# Patient Record
Sex: FEMALE | Race: BLACK OR AFRICAN AMERICAN | NOT HISPANIC OR LATINO | Employment: FULL TIME | URBAN - METROPOLITAN AREA
[De-identification: names, ages, dates, MRNs, and addresses within clinical notes are randomized per-mention and may not be internally consistent; named-entity substitution may affect disease eponyms.]

---

## 2017-07-21 ENCOUNTER — CONVERSION ENCOUNTER (OUTPATIENT)
Dept: MAMMOGRAPHY | Facility: HOSPITAL | Age: 52
End: 2017-07-21

## 2019-01-02 ENCOUNTER — HOSPITAL ENCOUNTER (OUTPATIENT)
Dept: URGENT CARE | Facility: CLINIC | Age: 54
Discharge: HOME | End: 2019-01-02
Attending: NURSE PRACTITIONER

## 2019-01-07 ENCOUNTER — CONVERSION ENCOUNTER (OUTPATIENT)
Dept: SURGERY | Facility: CLINIC | Age: 54
End: 2019-01-07

## 2019-01-07 ENCOUNTER — OFFICE VISIT CONVERTED (OUTPATIENT)
Dept: SURGERY | Facility: CLINIC | Age: 54
End: 2019-01-07
Attending: NURSE PRACTITIONER

## 2019-01-25 ENCOUNTER — HOSPITAL ENCOUNTER (OUTPATIENT)
Dept: MAMMOGRAPHY | Facility: HOSPITAL | Age: 54
Discharge: HOME | End: 2019-01-25
Attending: OBSTETRICS & GYNECOLOGY

## 2019-03-18 ENCOUNTER — HOSPITAL ENCOUNTER (OUTPATIENT)
Dept: GASTROENTEROLOGY | Facility: HOSPITAL | Age: 54
Setting detail: HOSPITAL OUTPATIENT SURGERY
Discharge: HOME | End: 2019-03-18
Attending: SURGERY

## 2019-03-18 LAB — HCG UR QL: NEGATIVE

## 2020-03-07 ENCOUNTER — HOSPITAL ENCOUNTER (OUTPATIENT)
Dept: URGENT CARE | Facility: CLINIC | Age: 55
Discharge: HOME | End: 2020-03-07

## 2020-05-01 ENCOUNTER — HOSPITAL ENCOUNTER (OUTPATIENT)
Dept: URGENT CARE | Facility: CLINIC | Age: 55
Discharge: HOME | End: 2020-05-01
Attending: FAMILY MEDICINE

## 2020-12-08 ENCOUNTER — HOSPITAL ENCOUNTER (OUTPATIENT)
Dept: URGENT CARE | Facility: CLINIC | Age: 55
Discharge: HOME | End: 2020-12-08
Attending: NURSE PRACTITIONER

## 2021-02-12 ENCOUNTER — HOSPITAL ENCOUNTER (OUTPATIENT)
Dept: GENERAL RADIOLOGY | Facility: HOSPITAL | Age: 56
Discharge: HOME | End: 2021-02-12
Attending: OBSTETRICS & GYNECOLOGY

## 2021-04-27 ENCOUNTER — HOSPITAL ENCOUNTER (OUTPATIENT)
Dept: URGENT CARE | Facility: CLINIC | Age: 56
Discharge: HOME | End: 2021-04-27
Attending: EMERGENCY MEDICINE

## 2021-05-15 VITALS — HEIGHT: 69 IN | WEIGHT: 213.37 LBS | BODY MASS INDEX: 31.6 KG/M2 | RESPIRATION RATE: 16 BRPM

## 2021-09-28 ENCOUNTER — LAB (OUTPATIENT)
Dept: LAB | Facility: HOSPITAL | Age: 56
End: 2021-09-28

## 2021-09-28 ENCOUNTER — TRANSCRIBE ORDERS (OUTPATIENT)
Dept: LAB | Facility: HOSPITAL | Age: 56
End: 2021-09-28

## 2021-09-28 DIAGNOSIS — E11.9 DIABETES MELLITUS WITHOUT COMPLICATION (HCC): ICD-10-CM

## 2021-09-28 DIAGNOSIS — E55.9 VITAMIN D DEFICIENCY: ICD-10-CM

## 2021-09-28 DIAGNOSIS — R53.83 TIREDNESS: ICD-10-CM

## 2021-09-28 DIAGNOSIS — E06.5 HYPOTHYROIDISM DUE TO FIBROUS INVASIVE THYROIDITIS: ICD-10-CM

## 2021-09-28 DIAGNOSIS — I10 ESSENTIAL HYPERTENSION, MALIGNANT: ICD-10-CM

## 2021-09-28 DIAGNOSIS — E03.8 HYPOTHYROIDISM DUE TO FIBROUS INVASIVE THYROIDITIS: ICD-10-CM

## 2021-09-28 DIAGNOSIS — R53.83 TIREDNESS: Primary | ICD-10-CM

## 2021-09-28 DIAGNOSIS — E78.5 HYPERLIPIDEMIA, UNSPECIFIED HYPERLIPIDEMIA TYPE: ICD-10-CM

## 2021-09-28 LAB
25(OH)D3 SERPL-MCNC: 36.2 NG/ML (ref 30–100)
ALBUMIN SERPL-MCNC: 4.9 G/DL (ref 3.5–5.2)
ALBUMIN/GLOB SERPL: 1.8 G/DL
ALP SERPL-CCNC: 77 U/L (ref 39–117)
ALT SERPL W P-5'-P-CCNC: 50 U/L (ref 1–33)
ANION GAP SERPL CALCULATED.3IONS-SCNC: 12.1 MMOL/L (ref 5–15)
AST SERPL-CCNC: 34 U/L (ref 1–32)
BASOPHILS # BLD AUTO: 0.06 10*3/MM3 (ref 0–0.2)
BASOPHILS NFR BLD AUTO: 0.9 % (ref 0–1.5)
BILIRUB SERPL-MCNC: 0.5 MG/DL (ref 0–1.2)
BUN SERPL-MCNC: 14 MG/DL (ref 6–20)
BUN/CREAT SERPL: 15.7 (ref 7–25)
CALCIUM SPEC-SCNC: 10.2 MG/DL (ref 8.6–10.5)
CHLORIDE SERPL-SCNC: 103 MMOL/L (ref 98–107)
CHOLEST SERPL-MCNC: 263 MG/DL (ref 0–200)
CO2 SERPL-SCNC: 25.9 MMOL/L (ref 22–29)
CREAT SERPL-MCNC: 0.89 MG/DL (ref 0.57–1)
DEPRECATED RDW RBC AUTO: 40.1 FL (ref 37–54)
EOSINOPHIL # BLD AUTO: 0.27 10*3/MM3 (ref 0–0.4)
EOSINOPHIL NFR BLD AUTO: 4 % (ref 0.3–6.2)
ERYTHROCYTE [DISTWIDTH] IN BLOOD BY AUTOMATED COUNT: 12.4 % (ref 12.3–15.4)
FOLATE SERPL-MCNC: 9.39 NG/ML (ref 4.78–24.2)
GFR SERPL CREATININE-BSD FRML MDRD: 80 ML/MIN/1.73
GLOBULIN UR ELPH-MCNC: 2.8 GM/DL
GLUCOSE SERPL-MCNC: 81 MG/DL (ref 65–99)
HBA1C MFR BLD: 5.81 % (ref 4.8–5.6)
HCT VFR BLD AUTO: 42.6 % (ref 34–46.6)
HDLC SERPL-MCNC: 81 MG/DL (ref 40–60)
HGB BLD-MCNC: 14.1 G/DL (ref 12–15.9)
IMM GRANULOCYTES # BLD AUTO: 0.01 10*3/MM3 (ref 0–0.05)
IMM GRANULOCYTES NFR BLD AUTO: 0.1 % (ref 0–0.5)
LDLC SERPL CALC-MCNC: 170 MG/DL (ref 0–100)
LDLC/HDLC SERPL: 2.06 {RATIO}
LYMPHOCYTES # BLD AUTO: 1.9 10*3/MM3 (ref 0.7–3.1)
LYMPHOCYTES NFR BLD AUTO: 28.2 % (ref 19.6–45.3)
MCH RBC QN AUTO: 29.4 PG (ref 26.6–33)
MCHC RBC AUTO-ENTMCNC: 33.1 G/DL (ref 31.5–35.7)
MCV RBC AUTO: 88.8 FL (ref 79–97)
MONOCYTES # BLD AUTO: 0.49 10*3/MM3 (ref 0.1–0.9)
MONOCYTES NFR BLD AUTO: 7.3 % (ref 5–12)
NEUTROPHILS NFR BLD AUTO: 4 10*3/MM3 (ref 1.7–7)
NEUTROPHILS NFR BLD AUTO: 59.5 % (ref 42.7–76)
NRBC BLD AUTO-RTO: 0 /100 WBC (ref 0–0.2)
PLATELET # BLD AUTO: 258 10*3/MM3 (ref 140–450)
PMV BLD AUTO: 10.4 FL (ref 6–12)
POTASSIUM SERPL-SCNC: 4 MMOL/L (ref 3.5–5.2)
PROT SERPL-MCNC: 7.7 G/DL (ref 6–8.5)
RBC # BLD AUTO: 4.8 10*6/MM3 (ref 3.77–5.28)
SODIUM SERPL-SCNC: 141 MMOL/L (ref 136–145)
T4 FREE SERPL-MCNC: 1.11 NG/DL (ref 0.93–1.7)
TRIGL SERPL-MCNC: 74 MG/DL (ref 0–150)
TSH SERPL DL<=0.05 MIU/L-ACNC: 1.01 UIU/ML (ref 0.27–4.2)
VIT B12 BLD-MCNC: 1037 PG/ML (ref 211–946)
VLDLC SERPL-MCNC: 12 MG/DL (ref 5–40)
WBC # BLD AUTO: 6.73 10*3/MM3 (ref 3.4–10.8)

## 2021-09-28 PROCEDURE — 36415 COLL VENOUS BLD VENIPUNCTURE: CPT

## 2021-09-28 PROCEDURE — 83036 HEMOGLOBIN GLYCOSYLATED A1C: CPT

## 2021-09-28 PROCEDURE — 85025 COMPLETE CBC W/AUTO DIFF WBC: CPT

## 2021-09-28 PROCEDURE — 84439 ASSAY OF FREE THYROXINE: CPT

## 2021-09-28 PROCEDURE — 82607 VITAMIN B-12: CPT

## 2021-09-28 PROCEDURE — 80061 LIPID PANEL: CPT

## 2021-09-28 PROCEDURE — 80053 COMPREHEN METABOLIC PANEL: CPT

## 2021-09-28 PROCEDURE — 82746 ASSAY OF FOLIC ACID SERUM: CPT

## 2021-09-28 PROCEDURE — 82306 VITAMIN D 25 HYDROXY: CPT

## 2021-09-28 PROCEDURE — 84443 ASSAY THYROID STIM HORMONE: CPT

## 2022-01-08 PROCEDURE — U0004 COV-19 TEST NON-CDC HGH THRU: HCPCS | Performed by: PHYSICIAN ASSISTANT

## 2022-01-10 ENCOUNTER — TELEPHONE (OUTPATIENT)
Dept: URGENT CARE | Facility: CLINIC | Age: 57
End: 2022-01-10

## 2022-01-10 DIAGNOSIS — U07.1 COVID-19: Primary | ICD-10-CM

## 2022-01-10 DIAGNOSIS — R05.9 COUGH: ICD-10-CM

## 2022-01-10 RX ORDER — DEXTROMETHORPHAN HYDROBROMIDE AND PROMETHAZINE HYDROCHLORIDE 15; 6.25 MG/5ML; MG/5ML
5 SYRUP ORAL 4 TIMES DAILY PRN
Qty: 118 ML | Refills: 0 | Status: SHIPPED | OUTPATIENT
Start: 2022-01-10 | End: 2022-01-15

## 2022-01-10 NOTE — TELEPHONE ENCOUNTER
Spoke with the patient via telephone and verified the patient's name, date of birth, street address.  Notify the patient that they are positive for COVID.  Discussed quarantine, symptomatic management, ER precautions with the patient.  Patient states that she was supposed to be prescribed a cough medicine.  States that she attempted to pick it up at the pharmacy however they did not have medications to be able to make the medication.  Patient requested to be sent to a different pharmacy.  All questions answered and patient verbalized understanding of information.

## 2022-03-28 ENCOUNTER — TRANSCRIBE ORDERS (OUTPATIENT)
Dept: ADMINISTRATIVE | Facility: HOSPITAL | Age: 57
End: 2022-03-28

## 2022-03-28 DIAGNOSIS — J45.40 ASTHMA, MODERATE PERSISTENT, POORLY-CONTROLLED: Primary | ICD-10-CM

## 2022-05-31 ENCOUNTER — HOSPITAL ENCOUNTER (OUTPATIENT)
Dept: RESPIRATORY THERAPY | Facility: HOSPITAL | Age: 57
End: 2022-05-31

## 2022-08-22 ENCOUNTER — OFFICE VISIT (OUTPATIENT)
Dept: OBSTETRICS AND GYNECOLOGY | Facility: CLINIC | Age: 57
End: 2022-08-22

## 2022-08-22 VITALS
WEIGHT: 225 LBS | SYSTOLIC BLOOD PRESSURE: 137 MMHG | BODY MASS INDEX: 33.23 KG/M2 | HEART RATE: 66 BPM | DIASTOLIC BLOOD PRESSURE: 77 MMHG

## 2022-08-22 DIAGNOSIS — Z01.419 WELL WOMAN EXAM: Primary | ICD-10-CM

## 2022-08-22 PROBLEM — R87.619 ABNORMAL PAP SMEAR OF CERVIX: Status: ACTIVE | Noted: 2022-08-22

## 2022-08-22 LAB
ALBUMIN SERPL-MCNC: 4.9 G/DL (ref 3.5–5.2)
ALBUMIN/GLOB SERPL: 1.8 G/DL
ALP SERPL-CCNC: 91 U/L (ref 39–117)
ALT SERPL W P-5'-P-CCNC: 23 U/L (ref 1–33)
ANION GAP SERPL CALCULATED.3IONS-SCNC: 11.6 MMOL/L (ref 5–15)
AST SERPL-CCNC: 26 U/L (ref 1–32)
BILIRUB SERPL-MCNC: 0.4 MG/DL (ref 0–1.2)
BUN SERPL-MCNC: 18 MG/DL (ref 6–20)
BUN/CREAT SERPL: 19.1 (ref 7–25)
CALCIUM SPEC-SCNC: 9.8 MG/DL (ref 8.6–10.5)
CHLORIDE SERPL-SCNC: 101 MMOL/L (ref 98–107)
CHOLEST SERPL-MCNC: 234 MG/DL (ref 0–200)
CO2 SERPL-SCNC: 28.4 MMOL/L (ref 22–29)
CREAT SERPL-MCNC: 0.94 MG/DL (ref 0.57–1)
DEPRECATED RDW RBC AUTO: 39.8 FL (ref 37–54)
EGFRCR SERPLBLD CKD-EPI 2021: 71.4 ML/MIN/1.73
ERYTHROCYTE [DISTWIDTH] IN BLOOD BY AUTOMATED COUNT: 12.8 % (ref 12.3–15.4)
GLOBULIN UR ELPH-MCNC: 2.8 GM/DL
GLUCOSE SERPL-MCNC: 83 MG/DL (ref 65–99)
HBA1C MFR BLD: 5.8 % (ref 4.8–5.6)
HCT VFR BLD AUTO: 42.4 % (ref 34–46.6)
HDLC SERPL-MCNC: 84 MG/DL (ref 40–60)
HGB BLD-MCNC: 14 G/DL (ref 12–15.9)
LDLC SERPL CALC-MCNC: 131 MG/DL (ref 0–100)
LDLC/HDLC SERPL: 1.53 {RATIO}
MCH RBC QN AUTO: 28.9 PG (ref 26.6–33)
MCHC RBC AUTO-ENTMCNC: 33 G/DL (ref 31.5–35.7)
MCV RBC AUTO: 87.4 FL (ref 79–97)
PLATELET # BLD AUTO: 244 10*3/MM3 (ref 140–450)
PMV BLD AUTO: 10.2 FL (ref 6–12)
POTASSIUM SERPL-SCNC: 4 MMOL/L (ref 3.5–5.2)
PROT SERPL-MCNC: 7.7 G/DL (ref 6–8.5)
RBC # BLD AUTO: 4.85 10*6/MM3 (ref 3.77–5.28)
SODIUM SERPL-SCNC: 141 MMOL/L (ref 136–145)
TRIGL SERPL-MCNC: 108 MG/DL (ref 0–150)
TSH SERPL DL<=0.05 MIU/L-ACNC: 1.74 UIU/ML (ref 0.27–4.2)
VLDLC SERPL-MCNC: 19 MG/DL (ref 5–40)
WBC NRBC COR # BLD: 7.35 10*3/MM3 (ref 3.4–10.8)

## 2022-08-22 PROCEDURE — G0123 SCREEN CERV/VAG THIN LAYER: HCPCS | Performed by: OBSTETRICS & GYNECOLOGY

## 2022-08-22 PROCEDURE — 80061 LIPID PANEL: CPT | Performed by: OBSTETRICS & GYNECOLOGY

## 2022-08-22 PROCEDURE — 80050 GENERAL HEALTH PANEL: CPT | Performed by: OBSTETRICS & GYNECOLOGY

## 2022-08-22 PROCEDURE — 83036 HEMOGLOBIN GLYCOSYLATED A1C: CPT | Performed by: OBSTETRICS & GYNECOLOGY

## 2022-08-22 PROCEDURE — 99396 PREV VISIT EST AGE 40-64: CPT | Performed by: OBSTETRICS & GYNECOLOGY

## 2022-08-22 PROCEDURE — 87624 HPV HI-RISK TYP POOLED RSLT: CPT | Performed by: OBSTETRICS & GYNECOLOGY

## 2022-08-22 RX ORDER — RIMEGEPANT SULFATE 75 MG/75MG
1 TABLET, ORALLY DISINTEGRATING ORAL EVERY OTHER DAY
COMMUNITY
Start: 2022-06-07

## 2022-08-22 RX ORDER — OMEPRAZOLE 20 MG/1
20 CAPSULE, DELAYED RELEASE ORAL DAILY
COMMUNITY
Start: 2022-06-22 | End: 2023-03-06 | Stop reason: DRUGHIGH

## 2022-08-22 RX ORDER — PREDNISONE 10 MG/1
1 TABLET ORAL DAILY
COMMUNITY
Start: 2022-05-24 | End: 2022-09-29

## 2022-08-22 NOTE — PROGRESS NOTES
Well Woman Visit    CC: Scheduled annual well gyn visit      [facility] intake in the past?: Yes    DATE PERFORMED:  Previously Qualified? NO Changes in Family Hx since? NO    HPI:   56 y.o.   Social History     Substance and Sexual Activity   Sexual Activity Not Currently   • Partners: Male   • Birth control/protection: Post-menopausal     No VB. OTC Estroven works well.     Pain:  None    PCP: needs PCP  History: PMHx, Meds, Allergies, PSHx, Social Hx, and POBHx all reviewed and updated.    Pt has no complaints today.    PHYSICAL EXAM:  /77   Pulse 66   Wt 102 kg (225 lb)   Breastfeeding No   BMI 33.23 kg/m²  Not found.  General- NAD, alert and oriented, appropriate  Psych- Normal mood, good memory  Neck- No masses, no thyroid enlargement  CV- Regular rhythm, no murnurs  Resp- CTA to bases, no wheezes  Abdomen- Soft, non distended, non tender, no masses    Breast left-  Bilaterally symmetrical, no masses, non tender, no nipple discharge  Breast right- Bilaterally symmetrical, no masses, non tender, no nipple discharge    External genitalia- Normal female, no lesions  Urethra/meatus- Normal, no masses, non tender  Bladder- Normal, no masses, non tender  Vagina- Normal, no atrophy, no lesions, no discharge.  Prolapse: No prolapse  Cvx- Normal, no lesions, no discharge, No cervical motion tenderness  Uterus- Normal size, shape & consistency.  Non tender, mobile, & no prolapse  Adnexa- No mass, non tender  Anus/Rectum/Perineum- Normal appearance, no mass, good sphincter tone, no hemorrhoids, no prolapse    Lymphatic- No palpable neck, axillary, or groin nodes  Ext- No edema, no cyanosis    Skin- No lesions, no rashes, no acanthosis nigricans      ASSESSMENT and PLAN:    Diagnoses and all orders for this visit:    1. Well woman exam (Primary)  -     IgP, Aptima HPV  -     Mammo Screening Digital Tomosynthesis Bilateral With CAD  -     Hemoglobin A1c  -     Lipid Panel  -     TSH  -     CBC (No Diff)  -      Comprehensive Metabolic Panel        Preventative:  • BREAST HEALTH- Monthly self breast exam importance and how to reviewed. MMG and/or MRI (prn) reviewed per society guidelines and her individual history. Screen: Updated today  • CERVICAL CANCER Screening- Reviewed current ASCCP guidelines for screening w and wo cotest HPV, age specific.  Screen: Updated today  • COLON CANCER Screening- Reviewed current medical society guidelines and options.  Screen:  Already up to date  • Importance of WEIGHT MANAGEMENT, nutrition, and exercise reviewed  • BONE HEALTH- Reviewed current medical society guidelines and options for testing, calcium and vit D intake.  Weight bearing exercise.  DEXA: Not medically needed  • VACCINATIONS Recommended: COVID and booster PRN, Flu annually, Tdap [id number], Zoster (49yo and older).  Importance discussed, risk being unvaccinated reviewed.  Questions answered  • Smoking status- NON SMOKER  • Follow up PCP/Specialist PMHx and Labs      She understands the importance of having any ordered tests to be performed in a timely fashion.  The risks of not performing them include, but are not limited to, advanced cancer stages, bone loss from osteoporosis and/or subsequent increase in morbidity and/or mortality.  She is encouraged to review her results online and/or contact or office if she has questions.     Follow Up:  Return in about 1 year (around 8/22/2023) for WWE.            Rin Wilks, DO  08/22/2022    [facility]  [address] [place] [address]  Dept: [phone]  Dept Fax: [phone]  Loc: [phone]  Loc Fax: [phone]

## 2022-08-26 LAB
CYTOLOGIST CVX/VAG CYTO: NORMAL
CYTOLOGY CVX/VAG DOC CYTO: NORMAL
CYTOLOGY CVX/VAG DOC THIN PREP: NORMAL
DX ICD CODE: NORMAL
HIV 1 & 2 AB SER-IMP: NORMAL
HPV I/H RISK 4 DNA CVX QL PROBE+SIG AMP: NEGATIVE
OTHER STN SPEC: NORMAL
STAT OF ADQ CVX/VAG CYTO-IMP: NORMAL

## 2022-09-03 ENCOUNTER — HOSPITAL ENCOUNTER (OUTPATIENT)
Dept: MAMMOGRAPHY | Facility: HOSPITAL | Age: 57
Discharge: HOME | End: 2022-09-03
Admitting: OBSTETRICS & GYNECOLOGY

## 2022-09-03 PROCEDURE — 77067 SCR MAMMO BI INCL CAD: CPT

## 2022-09-03 PROCEDURE — 77063 BREAST TOMOSYNTHESIS BI: CPT

## 2023-02-22 ENCOUNTER — OFFICE VISIT (OUTPATIENT)
Dept: FAMILY MEDICINE CLINIC | Facility: CLINIC | Age: 58
End: 2023-02-22
Payer: COMMERCIAL

## 2023-02-22 VITALS
SYSTOLIC BLOOD PRESSURE: 130 MMHG | TEMPERATURE: 97.8 F | OXYGEN SATURATION: 99 % | WEIGHT: 216 LBS | BODY MASS INDEX: 31.99 KG/M2 | HEIGHT: 69 IN | DIASTOLIC BLOOD PRESSURE: 74 MMHG

## 2023-02-22 DIAGNOSIS — R63.4 UNINTENTIONAL WEIGHT LOSS: ICD-10-CM

## 2023-02-22 DIAGNOSIS — R73.03 PREDIABETES: ICD-10-CM

## 2023-02-22 DIAGNOSIS — E78.2 MIXED HYPERLIPIDEMIA: ICD-10-CM

## 2023-02-22 DIAGNOSIS — Z76.89 ENCOUNTER TO ESTABLISH CARE: Primary | ICD-10-CM

## 2023-02-22 DIAGNOSIS — G43.909 MIGRAINE WITHOUT STATUS MIGRAINOSUS, NOT INTRACTABLE, UNSPECIFIED MIGRAINE TYPE: ICD-10-CM

## 2023-02-22 DIAGNOSIS — I10 PRIMARY HYPERTENSION: ICD-10-CM

## 2023-02-22 DIAGNOSIS — K21.9 GASTROESOPHAGEAL REFLUX DISEASE, UNSPECIFIED WHETHER ESOPHAGITIS PRESENT: ICD-10-CM

## 2023-02-22 DIAGNOSIS — J45.40 MODERATE PERSISTENT ASTHMA WITHOUT COMPLICATION: ICD-10-CM

## 2023-02-22 PROCEDURE — 99204 OFFICE O/P NEW MOD 45 MIN: CPT

## 2023-02-22 RX ORDER — LOSARTAN POTASSIUM AND HYDROCHLOROTHIAZIDE 12.5; 1 MG/1; MG/1
1 TABLET ORAL DAILY
Qty: 90 TABLET | Refills: 0 | Status: SHIPPED | OUTPATIENT
Start: 2023-02-22

## 2023-02-22 RX ORDER — EZETIMIBE/ATORVASTATIN CALCIUM 10 MG-10MG
TABLET ORAL NIGHTLY
COMMUNITY
End: 2023-02-22 | Stop reason: SDUPTHER

## 2023-02-22 RX ORDER — EZETIMIBE/ATORVASTATIN CALCIUM 10 MG-10MG
1 TABLET ORAL NIGHTLY
Qty: 90 TABLET | Refills: 0 | Status: SHIPPED | OUTPATIENT
Start: 2023-02-22 | End: 2023-02-28

## 2023-02-22 NOTE — ASSESSMENT & PLAN NOTE
Headaches are improving with treatment.  Continue current treatment regimen.  Advised to keep a headache diary.  Counseled regarding lifestyle modifications.

## 2023-02-22 NOTE — ASSESSMENT & PLAN NOTE
Lipid abnormalities are newly identified.  Nutritional counseling was provided. and Pharmacotherapy as ordered.  Lipids will be reassessed In 2 months.

## 2023-02-22 NOTE — ASSESSMENT & PLAN NOTE
Asthma is newly identified.  The patient is experiencing weekly daytime asthma symptoms. She is experiencing weekly nighttime asthma symptoms.  Discussed monitoring symptoms and use of quick-relief medications and contacting us early in the course of exacerbations.  Warning signs of respiratory distress were reviewed with the patient.

## 2023-02-22 NOTE — ASSESSMENT & PLAN NOTE
Hypertension is newly identified.  Continue current treatment regimen.  Dietary sodium restriction.  Continue current medications.  Blood pressure will be reassessed at the next regular appointment.

## 2023-02-22 NOTE — PROGRESS NOTES
Chief Complaint  Establish Care    Rishabh Donaldson presents to [facility]  History of Present Illness  Patient presents today to establish care.  She was previously seeing a PCP within this area who is no longer practicing. Patient was seen at urgent care facility the first week of February for URI and was treated for pneumonia. She was prescribed antibiotics, steroids and cough syrup.  Patient has been out of some of her medications since she has not seen a PCP in some time.  She did see her OB/GYN in August who did some routine lab work that showed elevated lipids and A1c.    Hypertension: Well managed on current medications of Hyzaar. Patient has been taking a daily magnesium as well because she was told that would help with her blood pressure.  Blood pressure is slightly elevated in the office today at 130/74.  Patient denies any associated symptoms such as current headache, chest pain, swelling to her extremities.    Hyperlipidemia: Currently taking Zetia-Atorvastatin daily.  Lipid panel was drawn by OB/GYN when she was seen in August with elevated total cholesterol at 234, elevated LDL of 131.    GERD/abdominal pain/weight loss/constipation: Patient had a work related injury a couple years ago and had a CT with contrast done that showed some abnormality with her liver. This imaging is not available and patient does not recall any further details. She describes her abdominal pain as an ache and intermittent cramping. She notices that the pain is worse after eating spicy and acidic foods. Bowel movements are irregular unless she takes the Linzess. She was previously prescribed this for IBS with predominant constipation. Her stool is sometimes dark in color, particularly when she is having abdominal pain. She last had a colonoscopy in March of 2019. She has never had an upper scope. She is scheduled to see gastro in March.     Chronic pain: She works at [employer] and  "is on her feet a lot. She has chronic low back pain, bilateral hip pain, bilateral foot pain due to flat feet. She sees pain management and podiatry for these issues.  She takes tramadol 50 mg twice daily and diclofenac 100 mg daily as needed.  She is try to decrease her use of diclofenac given her increase in abdominal pain but states that her pain is unmanageable without occasional use of diclofenac in addition to regular use of tramadol.    Migraines: Averages about 1 migraine headache weekly. She has been taking Nurtec every other day and is now using that as needed.     Asthma: Patient sees a pulmonologist in [place]. She is on multiple inhalers including Advair, Spiriva and Albuterol as needed.     Prediabetic: A1c previously 5.81 1-year ago and 5.80 6 months ago.  Patient has never been treated for diabetes in the past.      Objective   Vital Signs:  /74 (BP Location: Left arm, Patient Position: Sitting)   Temp 97.8 °F (36.6 °C) (Oral)   Ht 175.3 cm (69\")   Wt 98 kg (216 lb)   SpO2 99%   BMI 31.90 kg/m²   Estimated body mass index is 31.9 kg/m² as calculated from the following:    Height as of this encounter: 175.3 cm (69\").    Weight as of this encounter: 98 kg (216 lb).       BMI is >= 30 and <35. (Class 1 Obesity). The following options were offered after discussion;: exercise counseling/recommendations and nutrition counseling/recommendations      Physical Exam  Vitals reviewed.   Constitutional:       Appearance: Normal appearance. She is well-developed. She is obese.   HENT:      Head: Normocephalic and atraumatic.   Eyes:      Conjunctiva/sclera: Conjunctivae normal.      Pupils: Pupils are equal, round, and reactive to light.   Cardiovascular:      Rate and Rhythm: Normal rate and regular rhythm.      Heart sounds: No murmur heard.    No friction rub. No gallop.   Pulmonary:      Effort: Pulmonary effort is normal.      Breath sounds: Examination of the right-upper field reveals " wheezing. Examination of the left-upper field reveals wheezing. Wheezing present. No rhonchi.   Abdominal:      General: Bowel sounds are normal. There is no distension.      Palpations: Abdomen is soft.      Tenderness: There is no abdominal tenderness.   Skin:     General: Skin is warm and dry.   Neurological:      Mental Status: She is alert and oriented to person, place, and time.      Cranial Nerves: No cranial nerve deficit.   Psychiatric:         Mood and Affect: Mood and affect normal.         Behavior: Behavior normal.         Thought Content: Thought content normal.         Judgment: Judgment normal.        Result Review :                   Assessment and Plan   Diagnoses and all orders for this visit:    1. Encounter to establish care (Primary)    2. Primary hypertension  Assessment & Plan:  Hypertension is newly identified.  Continue current treatment regimen.  Dietary sodium restriction.  Continue current medications.  Blood pressure will be reassessed at the next regular appointment.    Orders:  -     losartan-hydrochlorothiazide (Hyzaar) 100-12.5 MG per tablet; Take 1 tablet by mouth Daily. Contact your primary care provider for any refill tomorrow.  Dispense: 90 tablet; Refill: 0  -     Hemoglobin A1c; Future  -     CBC w AUTO Differential; Future  -     Comprehensive metabolic panel; Future    3. Prediabetes  -     Hemoglobin A1c; Future  -     CBC w AUTO Differential; Future  -     Comprehensive metabolic panel; Future    4. Mixed hyperlipidemia  Assessment & Plan:  Lipid abnormalities are newly identified.  Nutritional counseling was provided. and Pharmacotherapy as ordered.  Lipids will be reassessed In 2 months.    Orders:  -     Lipid Panel; Future  -     Hemoglobin A1c; Future  -     CBC w AUTO Differential; Future  -     Comprehensive metabolic panel; Future    5. Gastroesophageal reflux disease, unspecified whether esophagitis present    6. Unintentional weight loss    7. Migraine without  status migrainosus, not intractable, unspecified migraine type  Assessment & Plan:  Headaches are improving with treatment.  Continue current treatment regimen.  Advised to keep a headache diary.  Counseled regarding lifestyle modifications.          8. Moderate persistent asthma without complication  Assessment & Plan:  Asthma is newly identified.  The patient is experiencing weekly daytime asthma symptoms. She is experiencing weekly nighttime asthma symptoms.  Discussed monitoring symptoms and use of quick-relief medications and contacting us early in the course of exacerbations.  Warning signs of respiratory distress were reviewed with the patient.           Other orders  -     Ezetimibe-Atorvastatin 10-10 MG tablet; Take 1 tablet by mouth Every Night.  Dispense: 90 tablet; Refill: 0  Patient has been out of her medications for several months now, stating that she has not taken her cholesterol medication for some time and is fearful that her lipids will be significantly elevated.  Labs were drawn by OB/GYN in August with only abnormalities being slightly elevated hemoglobin A1c and lipid panel.  Patient will be provided refills on her medications and will follow-up with me in 2 months.  She was advised to walk-in to have her labs drawn 1 week prior to follow-up appointment.    Patient was referred to gastroenterology by a previous provider that she saw and has an upcoming appointment next month to further evaluate her symptoms of GERD, abdominal pain, IBS with predominant constipation and unintentional weight loss.  She did have a colonoscopy done in 2019 which showed diverticulosis but no other abnormalities.  She denies any blood in her stool or significant changes in her bowel pattern although she does have constipation when she does not take the Linzess regularly.         Follow Up   Return in about 2 months (around 4/22/2023).  Patient was given instructions and counseling regarding her condition or for  health maintenance advice. Please see specific information pulled into the AVS if appropriate.

## 2023-02-27 ENCOUNTER — LAB (OUTPATIENT)
Dept: LAB | Facility: HOSPITAL | Age: 58
End: 2023-02-27
Payer: COMMERCIAL

## 2023-02-27 DIAGNOSIS — I10 PRIMARY HYPERTENSION: ICD-10-CM

## 2023-02-27 DIAGNOSIS — E78.2 MIXED HYPERLIPIDEMIA: ICD-10-CM

## 2023-02-27 DIAGNOSIS — R73.03 PREDIABETES: ICD-10-CM

## 2023-02-27 LAB
ALBUMIN SERPL-MCNC: 4.5 G/DL (ref 3.5–5.2)
ALBUMIN/GLOB SERPL: 1.6 G/DL
ALP SERPL-CCNC: 83 U/L (ref 39–117)
ALT SERPL W P-5'-P-CCNC: 19 U/L (ref 1–33)
ANION GAP SERPL CALCULATED.3IONS-SCNC: 7.4 MMOL/L (ref 5–15)
AST SERPL-CCNC: 22 U/L (ref 1–32)
BASOPHILS # BLD AUTO: 0.04 10*3/MM3 (ref 0–0.2)
BASOPHILS NFR BLD AUTO: 0.6 % (ref 0–1.5)
BILIRUB SERPL-MCNC: 0.6 MG/DL (ref 0–1.2)
BUN SERPL-MCNC: 10 MG/DL (ref 6–20)
BUN/CREAT SERPL: 11.6 (ref 7–25)
CALCIUM SPEC-SCNC: 10.2 MG/DL (ref 8.6–10.5)
CHLORIDE SERPL-SCNC: 102 MMOL/L (ref 98–107)
CHOLEST SERPL-MCNC: 255 MG/DL (ref 0–200)
CO2 SERPL-SCNC: 29.6 MMOL/L (ref 22–29)
CREAT SERPL-MCNC: 0.86 MG/DL (ref 0.57–1)
DEPRECATED RDW RBC AUTO: 40.9 FL (ref 37–54)
EGFRCR SERPLBLD CKD-EPI 2021: 78.9 ML/MIN/1.73
EOSINOPHIL # BLD AUTO: 0.21 10*3/MM3 (ref 0–0.4)
EOSINOPHIL NFR BLD AUTO: 3 % (ref 0.3–6.2)
ERYTHROCYTE [DISTWIDTH] IN BLOOD BY AUTOMATED COUNT: 12.4 % (ref 12.3–15.4)
GLOBULIN UR ELPH-MCNC: 2.8 GM/DL
GLUCOSE SERPL-MCNC: 88 MG/DL (ref 65–99)
HBA1C MFR BLD: 6 % (ref 4.8–5.6)
HCT VFR BLD AUTO: 41.2 % (ref 34–46.6)
HDLC SERPL-MCNC: 97 MG/DL (ref 40–60)
HGB BLD-MCNC: 14 G/DL (ref 12–15.9)
IMM GRANULOCYTES # BLD AUTO: 0.02 10*3/MM3 (ref 0–0.05)
IMM GRANULOCYTES NFR BLD AUTO: 0.3 % (ref 0–0.5)
LDLC SERPL CALC-MCNC: 145 MG/DL (ref 0–100)
LDLC/HDLC SERPL: 1.46 {RATIO}
LYMPHOCYTES # BLD AUTO: 1.63 10*3/MM3 (ref 0.7–3.1)
LYMPHOCYTES NFR BLD AUTO: 23.6 % (ref 19.6–45.3)
MCH RBC QN AUTO: 30.4 PG (ref 26.6–33)
MCHC RBC AUTO-ENTMCNC: 34 G/DL (ref 31.5–35.7)
MCV RBC AUTO: 89.4 FL (ref 79–97)
MONOCYTES # BLD AUTO: 0.54 10*3/MM3 (ref 0.1–0.9)
MONOCYTES NFR BLD AUTO: 7.8 % (ref 5–12)
NEUTROPHILS NFR BLD AUTO: 4.47 10*3/MM3 (ref 1.7–7)
NEUTROPHILS NFR BLD AUTO: 64.7 % (ref 42.7–76)
NRBC BLD AUTO-RTO: 0 /100 WBC (ref 0–0.2)
PLATELET # BLD AUTO: 234 10*3/MM3 (ref 140–450)
PMV BLD AUTO: 10.3 FL (ref 6–12)
POTASSIUM SERPL-SCNC: 4.4 MMOL/L (ref 3.5–5.2)
PROT SERPL-MCNC: 7.3 G/DL (ref 6–8.5)
RBC # BLD AUTO: 4.61 10*6/MM3 (ref 3.77–5.28)
SODIUM SERPL-SCNC: 139 MMOL/L (ref 136–145)
TRIGL SERPL-MCNC: 81 MG/DL (ref 0–150)
VLDLC SERPL-MCNC: 13 MG/DL (ref 5–40)
WBC NRBC COR # BLD: 6.91 10*3/MM3 (ref 3.4–10.8)

## 2023-02-27 PROCEDURE — 80061 LIPID PANEL: CPT

## 2023-02-27 PROCEDURE — 80053 COMPREHEN METABOLIC PANEL: CPT

## 2023-02-27 PROCEDURE — 83036 HEMOGLOBIN GLYCOSYLATED A1C: CPT

## 2023-02-27 PROCEDURE — 85025 COMPLETE CBC W/AUTO DIFF WBC: CPT

## 2023-02-28 DIAGNOSIS — E78.2 MIXED HYPERLIPIDEMIA: Primary | ICD-10-CM

## 2023-02-28 RX ORDER — EZETIMIBE/ATORVASTATIN CALCIUM 10 MG-20MG
1 TABLET ORAL NIGHTLY
Qty: 90 TABLET | Refills: 1 | Status: SHIPPED | OUTPATIENT
Start: 2023-02-28 | End: 2023-04-02

## 2023-03-06 ENCOUNTER — PREP FOR SURGERY (OUTPATIENT)
Dept: OTHER | Facility: HOSPITAL | Age: 58
End: 2023-03-06
Payer: COMMERCIAL

## 2023-03-06 ENCOUNTER — OFFICE VISIT (OUTPATIENT)
Dept: GASTROENTEROLOGY | Facility: CLINIC | Age: 58
End: 2023-03-06
Payer: COMMERCIAL

## 2023-03-06 VITALS
SYSTOLIC BLOOD PRESSURE: 124 MMHG | HEART RATE: 57 BPM | HEIGHT: 69 IN | WEIGHT: 213.6 LBS | BODY MASS INDEX: 31.64 KG/M2 | DIASTOLIC BLOOD PRESSURE: 70 MMHG

## 2023-03-06 DIAGNOSIS — R10.13 EPIGASTRIC PAIN: Primary | ICD-10-CM

## 2023-03-06 DIAGNOSIS — K62.5 RECTAL BLEEDING: ICD-10-CM

## 2023-03-06 DIAGNOSIS — K59.04 CHRONIC IDIOPATHIC CONSTIPATION: ICD-10-CM

## 2023-03-06 DIAGNOSIS — R63.4 WEIGHT LOSS: ICD-10-CM

## 2023-03-06 PROCEDURE — 99214 OFFICE O/P EST MOD 30 MIN: CPT | Performed by: NURSE PRACTITIONER

## 2023-03-06 RX ORDER — POLYETHYLENE GLYCOL 3350, SODIUM SULFATE, SODIUM CHLORIDE, POTASSIUM CHLORIDE, ASCORBIC ACID, SODIUM ASCORBATE 140-9-5.2G
1 KIT ORAL 2 TIMES DAILY
Qty: 1 EACH | Refills: 0 | Status: SHIPPED | OUTPATIENT
Start: 2023-03-06 | End: 2023-03-07

## 2023-03-06 RX ORDER — UBIDECARENONE 75 MG
50 CAPSULE ORAL DAILY
COMMUNITY

## 2023-03-06 RX ORDER — LINACLOTIDE 72 UG/1
1 CAPSULE, GELATIN COATED ORAL DAILY
COMMUNITY
Start: 2023-01-02

## 2023-03-06 RX ORDER — ERGOCALCIFEROL (VITAMIN D2) 10 MCG
400 TABLET ORAL DAILY
COMMUNITY

## 2023-03-06 RX ORDER — OMEPRAZOLE 40 MG/1
40 CAPSULE, DELAYED RELEASE ORAL DAILY
Qty: 90 CAPSULE | Refills: 0 | Status: SHIPPED | OUTPATIENT
Start: 2023-03-06 | End: 2023-06-04

## 2023-03-06 NOTE — PROGRESS NOTES
"Patient Name: Larissa Donaldson   Visit Date: 03/06/2023   Patient ID: [id number]  Provider: BECCA Ward    Sex: female  Location:  Location Address:  Location Phone: [address]  [place] [address] [phone]    YOB: 1965  Age: 57 y.o.   Primary Care Provider Julissa Wilkerson APRN      Referring Provider: Jovan Almendarez MD        Chief Complaint  Abdominal Pain (Pt states intermittent ABD pain with meals, center and lower ABD ), Vomiting (3 episodes weekly ), Weight Loss (Lost about 20# in 2 months without trying ), and Constipation (Pt states having 1 small bm daily )    History of Present Illness  New pt presents w abd pain that started last year but has worsened, occurs w meals.  Pain is lasting all day now.  Was given Omeprazole, stopped taking d/t not helping, but she restarted it again every day last week to try again. Denies HB. Pt has been taking Diclofenac, trying to reduce use. Pt has lost 20# d/t abd pain and wanting to avoid eating.  Has had N/V occasionally, does not vomit frequently - last time about 8-9 days ago.     Also has c/o constipation, was given Linzess, states it caused diarrhea, so now taking PRN and doesn't work well like this, has tried probiotics and fiber. States if no BM for several days stool will be very dark, usually [place] #1 occasionally #4. Has seen [facility] w straining, attributes to hemorrhoids.  Last BM this AM, #1  Hx \"lobular cystic lesions\" on liver on CT 2019     Last colonoscopy 2019 by Dr. Soares: Diverticula left side of the colon was reported normal  Past Medical History:   Diagnosis Date   • Abnormal Pap smear of cervix    • Allergic 2012   • Arthritis 2001   • Asthma    • Diverticulosis 2016   • High cholesterol    • Hypertension    • Low back pain 2009   • Migraine    • Pneumonia    • Scoliosis        Past Surgical History:   Procedure Laterality Date   • COLONOSCOPY  03/18/2019   • WISDOM TOOTH EXTRACTION         No " "Known Allergies    Family History   Problem Relation Age of Onset   • Hyperlipidemia Mother         Both parents   • Hypertension Mother         Both parents   • Stroke Mother    • Vision loss Mother    • Arthritis Father    • Diabetes Father    • Asthma Daughter    • Miscarriages / Stillbirths Daughter            • Breast cancer Neg Hx    • Ovarian cancer Neg Hx    • Uterine cancer Neg Hx    • Colon cancer Neg Hx    • Melanoma Neg Hx         Social History     Tobacco Use   • Smoking status: Never   • Smokeless tobacco: Never   Vaping Use   • Vaping Use: Never used   Substance Use Topics   • Alcohol use: Yes     Alcohol/week: 2.0 standard drinks     Types: 2 Glasses of wine per week     Comment: Red or a special occasions   • Drug use: Never       Objective     Vital Signs:   /70 (BP Location: Left arm, Patient Position: Sitting, Cuff Size: Adult)   Pulse 57   Ht 175.3 cm (69\")   Wt 96.9 kg (213 lb 9.6 oz)   BMI 31.54 kg/m²       Physical Exam  Constitutional:       General: The patient is not in acute distress.     Appearance: Normal appearance.   HENT:      Head: Normocephalic and atraumatic.      Nose: Nose normal.   Pulmonary:      Effort: Pulmonary effort is normal. No respiratory distress.   Abdominal:      General: Abdomen is flat.      Palpations: Abdomen is soft. There is no mass.      Tenderness: There is no abdominal tenderness x  - epigastric tenderness and lower abd tenderness. There is no guarding.   Musculoskeletal:      Cervical back: Neck supple.      Right lower leg: No edema.      Left lower leg: No edema.   Skin:     General: Skin is warm and dry.   Neurological:      General: No focal deficit present.      Mental Status: The patient is alert and oriented to person, place, and time.      Gait: Gait normal.   Psychiatric:         Mood and Affect: Mood normal.         Speech: Speech normal.         Behavior: Behavior normal.         Thought Content: Thought content normal. "     Result Review :   The following data was reviewed by: BECCA Ward on 03/06/2023:    CBC w/diff    CBC w/Diff 8/22/22 2/27/23   WBC 7.35 6.91   RBC 4.85 4.61   Hemoglobin 14.0 14.0   Hematocrit 42.4 41.2   MCV 87.4 89.4   MCH 28.9 30.4   MCHC 33.0 34.0   RDW 12.8 12.4   Platelets 244 234   Neutrophil Rel %  64.7   Immature Granulocyte Rel %  0.3   Lymphocyte Rel %  23.6   Monocyte Rel %  7.8   Eosinophil Rel %  3.0   Basophil Rel %  0.6           CMP    CMP 8/22/22 2/27/23   Glucose 83 88   BUN 18 10   Creatinine 0.94 0.86   eGFR 71.4 78.9   Sodium 141 139   Potassium 4.0 4.4   Chloride 101 102   Calcium 9.8 10.2   Total Protein 7.7 7.3   Albumin 4.90 4.5   Globulin 2.8 2.8   Total Bilirubin 0.4 0.6   Alkaline Phosphatase 91 83   AST (SGOT) 26 22   ALT (SGPT) 23 19   Albumin/Globulin Ratio 1.8 1.6   BUN/Creatinine Ratio 19.1 11.6   Anion Gap 11.6 7.4      Comments are available for some flowsheets but are not being displayed.                         Assessment and Plan    Diagnoses and all orders for this visit:    1. Epigastric pain (Primary)  -     US Gallbladder; Future    2. Chronic idiopathic constipation    3. Rectal bleeding    4. Weight loss    Other orders  -     omeprazole (priLOSEC) 40 MG capsule; Take 1 capsule by mouth Daily for 90 days.  Dispense: 90 capsule; Refill: 0  -     PEG-KCl-NaCl-NaSulf-Na Asc-C (Plenvu) 140 g reconstituted solution solution; Take 140 g by mouth 2 (Two) Times a Day for 1 day. Take per office instructions  Dispense: 1 each; Refill: 0              Follow Up   Return for follow up after procedure.   Advised pt to stop NSAIDs  -- Diclofenac and (Advil, Aleve, Ibuprofen, Motrin, Naproxen).   May take Tylenol, max dose is 2000 mg/d.   Increase Omeprazole 40 mg/d  Liver US / check GB   Linzess trial every other day -call if not working well   EGD/COLONOSCOPy Surgical Risk and Benefits: Possible risks/complications, benefits, and alternatives to surgical or  invasive procedure have been explained to patient and/or legal guardian; risks include bleeding, infection, and perforation. Patient has been evaluated and can tolerate anesthesia and/or sedation. Risks, benefits, and alternatives to anesthesia and sedation have been explained to patient and/or legal guardian.   Plenvu - pt was worried about 4L prep. Advised linzess or miralax every day week before colonsocopy      Patient was given instructions and counseling regarding her condition or for health maintenance advice. Please see specific information pulled into the AVS if appropriate.

## 2023-03-06 NOTE — PATIENT INSTRUCTIONS
Advised pt to stop NSAIDs  -- Diclofenac and (Advil, Aleve, Ibuprofen, Motrin, Naproxen).   May take Tylenol, max dose is 2000 mg/d.   Call in a couple weeks with update if not doing better with med changes

## 2023-03-07 ENCOUNTER — TELEPHONE (OUTPATIENT)
Dept: GASTROENTEROLOGY | Facility: CLINIC | Age: 58
End: 2023-03-07
Payer: COMMERCIAL

## 2023-03-08 ENCOUNTER — TELEPHONE (OUTPATIENT)
Dept: FAMILY MEDICINE CLINIC | Facility: CLINIC | Age: 58
End: 2023-03-08

## 2023-03-08 NOTE — TELEPHONE ENCOUNTER
Caller: Larissa Donaldson    Relationship to patient: Self    Best call back number: [phone]    Patient is needing: PATIENT IS INQUIRING IF SHE CAN GET HER ULTRASOUND SOONER. SHE IS IN PAIN AND SHE IS WILLING TO GO TO [place] TO GET THIS DONE SOONER IF POSSIBLE, AROUND 3/17/23 IS PREFERRED AS SHE HAS TAKEN OFF THAT DAY.  PLEASE CALL TO ADVISE.

## 2023-03-17 ENCOUNTER — TELEPHONE (OUTPATIENT)
Dept: GASTROENTEROLOGY | Facility: CLINIC | Age: 58
End: 2023-03-17
Payer: COMMERCIAL

## 2023-03-30 ENCOUNTER — TELEPHONE (OUTPATIENT)
Dept: FAMILY MEDICINE CLINIC | Facility: CLINIC | Age: 58
End: 2023-03-30
Payer: COMMERCIAL

## 2023-03-30 NOTE — TELEPHONE ENCOUNTER
PATIENT IS WANTING TO SWITCH HER MEDICATION FROM THE EZETIMIBE- ATORVASTATIN TO WHAT SHE WAS ON BEFORE THE EZETIMBE VYTORIN     PLEASE ADVISE

## 2023-04-02 RX ORDER — EZETIMIBE AND SIMVASTATIN 10; 40 MG/1; MG/1
1 TABLET ORAL NIGHTLY
Qty: 90 TABLET | Refills: 1 | Status: SHIPPED | OUTPATIENT
Start: 2023-04-02

## 2023-04-13 RX ORDER — DICLOFENAC SODIUM 75 MG/1
TABLET, DELAYED RELEASE ORAL
COMMUNITY
Start: 2023-03-22 | End: 2023-04-17

## 2023-04-17 ENCOUNTER — OFFICE VISIT (OUTPATIENT)
Dept: FAMILY MEDICINE CLINIC | Facility: CLINIC | Age: 58
End: 2023-04-17
Payer: COMMERCIAL

## 2023-04-17 VITALS
WEIGHT: 208.2 LBS | BODY MASS INDEX: 30.84 KG/M2 | TEMPERATURE: 97.3 F | HEIGHT: 69 IN | HEART RATE: 73 BPM | DIASTOLIC BLOOD PRESSURE: 86 MMHG | SYSTOLIC BLOOD PRESSURE: 130 MMHG | OXYGEN SATURATION: 98 %

## 2023-04-17 DIAGNOSIS — Z23 NEED FOR PNEUMOCOCCAL VACCINE: ICD-10-CM

## 2023-04-17 DIAGNOSIS — E78.2 MIXED HYPERLIPIDEMIA: ICD-10-CM

## 2023-04-17 DIAGNOSIS — I10 PRIMARY HYPERTENSION: Primary | ICD-10-CM

## 2023-04-17 DIAGNOSIS — R73.03 PREDIABETES: ICD-10-CM

## 2023-04-17 DIAGNOSIS — K58.1 IRRITABLE BOWEL SYNDROME WITH CONSTIPATION: ICD-10-CM

## 2023-04-17 RX ORDER — ERGOCALCIFEROL (VITAMIN D2) 10 MCG
400 TABLET ORAL DAILY
Status: CANCELLED | OUTPATIENT
Start: 2023-04-17

## 2023-04-17 RX ORDER — ERGOCALCIFEROL 1.25 MG/1
50000 CAPSULE ORAL WEEKLY
Qty: 5 CAPSULE | Refills: 10 | Status: SHIPPED | OUTPATIENT
Start: 2023-04-17

## 2023-04-17 RX ORDER — UBIDECARENONE 75 MG
50 CAPSULE ORAL DAILY
Qty: 90 TABLET | Refills: 1 | Status: SHIPPED | OUTPATIENT
Start: 2023-04-17

## 2023-04-17 NOTE — PROGRESS NOTES
Chief Complaint  Chief Complaint   Patient presents with   • Diabetes     2 month follow up    • Hypertension     2 month follow up        Subjective      Larissa Donaldson presents to [facility]  History of Present Illness  Patient presents today for follow-up visit.    Hypertension: Well managed on current medication of Hyzaar as previously prescribed by previous PCP.  Blood pressure today is 130/86 with a heart rate of 73. She occasionally checks her blood pressure when she is shopping and it usually runs 120s/80s. She denies any chest pain, swelling of extremities.  She says she is feeling a little bit anxious at her appointment today and that is likely why her blood pressure is slightly elevated.    Hyperlipidemia: Patient had previously been on a statin but stopped that due to leg cramping. She was then prescribed Zetia only but prior to her recent visit to establish care she had been out of all medications for about a month.  Patient is agreeable to trying the combo medication, Vytorin, as prescribed and will notify me via [employer] if she has any complications.    Prediabetes: A1c most recently 6.00.  Patient has never taken any medication to treat diabetes or prediabetes.    Abdominal pain: When patient was last seen she reported abdominal pain with intermittent cramping, worse after eating spicy and acidic foods.  She takes Linzess for IBS with predominant constipation and had reported that she sometimes had dark-colored stools.  She has since seen gastroenterology who advised her to continue use of Linzess and omeprazole increased to 40 mg daily, advised to discontinue use of NSAIDs and to use Tylenol for pain management.  She is scheduled for an EGD/colonoscopy in May. She was scheduled to have an ultrasound of her gallbladder but it had to be canceled and she has not rescheduled.     Objective     Medical History:  Past Medical History:   Diagnosis Date   • Abnormal Pap  smear of cervix    • Allergic    • Arthritis    • Asthma    • Diverticulosis    • GERD (gastroesophageal reflux disease)    • High cholesterol    • Hypertension    • Low back pain    • Migraine    • Pneumonia    • Scoliosis      Past Surgical History:   Procedure Laterality Date   • COLONOSCOPY  2019   • WISDOM TOOTH EXTRACTION        Social History     Tobacco Use   • Smoking status: Never   • Smokeless tobacco: Never   Vaping Use   • Vaping Use: Never used   Substance Use Topics   • Alcohol use: Yes     Alcohol/week: 2.0 standard drinks     Types: 2 Glasses of wine per week     Comment: Red or a special occasions   • Drug use: Never     Family History   Problem Relation Age of Onset   • Hyperlipidemia Mother         Both parents   • Hypertension Mother         Both parents   • Stroke Mother    • Vision loss Mother    • Arthritis Father    • Diabetes Father    • Asthma Daughter    • Miscarriages / Stillbirths Daughter            • Breast cancer Neg Hx    • Ovarian cancer Neg Hx    • Uterine cancer Neg Hx    • Colon cancer Neg Hx    • Melanoma Neg Hx        Medications:  Prior to Admission medications    Medication Sig Start Date End Date Taking? Authorizing Provider   albuterol sulfate  (90 Base) MCG/ACT inhaler albuterol sulfate HFA 90 mcg/actuation aerosol inhaler   Yes Emergency, Nurse Nick, RN   Diclofenac Sodium (VOLTAREN) 1 % gel gel Apply 4 g topically to the appropriate area as directed 4 (Four) Times a Day As Needed.   Yes Provider, MD Leonidas   ezetimibe-simvastatin (Vytorin) 10-40 MG per tablet Take 1 tablet by mouth Every Night. 23  Yes Julissa Wilkerson APRN   fluticasone (FLONASE) 50 MCG/ACT nasal spray fluticasone propionate 50 mcg/actuation nasal spray,suspension   Yes Emergency, Nurse Nick, RN   fluticasone-salmeterol (ADVAIR) 500-50 MCG/DOSE DISKUS Advair Diskus 500 mcg-50 mcg/dose powder for inhalation   INHALE 1 PUFF BY MOUTH TWICE A DAY    Yes Emergency, Nurse NEHA Romero   hydrOXYzine (ATARAX) 25 MG tablet hydroxyzine HCl 25 mg tablet   Yes Emergency, Nurse NEHA Romero   levocetirizine (XYZAL) 5 MG tablet levocetirizine 5 mg tablet   Yes Emergency, Nurse NEHA Romero   Linzess 72 MCG capsule capsule Take 1 capsule by mouth Daily. 1/2/23  Yes Leonidas Garcia MD   losartan-hydrochlorothiazide (Hyzaar) 100-12.5 MG per tablet Take 1 tablet by mouth Daily. Contact your primary care provider for any refill tomorrow. 2/22/23  Yes Julissa Wilkerson APRN   Nurtec 75 MG dispersible tablet Take 1 tablet by mouth Every Other Day. 6/7/22  Yes Leonidas Garcia MD   omeprazole (priLOSEC) 40 MG capsule Take 1 capsule by mouth Daily for 90 days. 3/6/23 6/4/23 Yes Lyudmila Yao APRN   Tiotropium Bromide Monohydrate (Spiriva Respimat) 1.25 MCG/ACT aerosol solution inhaler Spiriva Respimat 1.25 mcg/actuation solution for inhalation   INHALE 2 PUFFS BY MOUTH ONCE DAILY   Yes Emergency, Nurse NEHA Romero   traMADol (ULTRAM) 50 MG tablet Every 12 (Twelve) Hours.   Yes Emergency, Nurse NEHA Romero   vitamin B-12 (CYANOCOBALAMIN) 100 MCG tablet Take 50 mcg by mouth Daily.   Yes ProviderLeonidas MD   Vitamin D, Cholecalciferol, (CHOLECALCIFEROL) 10 MCG (400 UNIT) tablet Take 1 tablet by mouth Daily.   Yes Leonidas Garcia MD   diclofenac (VOLTAREN) 75 MG EC tablet  3/22/23   Leonidas Garcia MD   diclofenac sodium (VOTAREN XR) 100 MG 24 hr tablet Take 1 tablet by mouth Daily.  Patient not taking: Reported on 4/17/2023    Leonidas Garcia MD   promethazine-dextromethorphan (PROMETHAZINE-DM) 6.25-15 MG/5ML syrup Take 1 teaspoon each 4 to 6 hours as needed for cough.  Be aware that the medication can make you drowsy.  Patient not taking: Reported on 4/17/2023 2/4/23   Krysten Noonan MD        Allergies:   Patient has no known allergies.    Health Maintenance Due   Topic Date Due   • COLORECTAL CANCER SCREENING  Never done   • Pneumococcal  "Vaccine 0-64 (1 - PCV) Never done   • ZOSTER VACCINE (1 of 2) Never done   • HEPATITIS C SCREENING  Never done   • ANNUAL PHYSICAL  Never done   • COVID-19 Vaccine (4 - Booster for Pfizer series) 06/18/2022         Vital Signs:   /86   Pulse 73   Temp 97.3 °F (36.3 °C)   Ht 175.3 cm (69\")   Wt 94.4 kg (208 lb 3.2 oz)   SpO2 98%   BMI 30.75 kg/m²     Wt Readings from Last 3 Encounters:   04/17/23 94.4 kg (208 lb 3.2 oz)   03/06/23 96.9 kg (213 lb 9.6 oz)   02/22/23 98 kg (216 lb)     BP Readings from Last 3 Encounters:   04/17/23 130/86   03/06/23 124/70   02/22/23 130/74       BMI is >= 30 and <35. (Class 1 Obesity). The following options were offered after discussion;: exercise counseling/recommendations and nutrition counseling/recommendations       Physical Exam  Vitals reviewed.   Constitutional:       Appearance: Normal appearance. She is well-developed. She is obese.   HENT:      Head: Normocephalic and atraumatic.   Eyes:      Conjunctiva/sclera: Conjunctivae normal.      Pupils: Pupils are equal, round, and reactive to light.   Cardiovascular:      Rate and Rhythm: Normal rate and regular rhythm.      Heart sounds: No murmur heard.    No friction rub. No gallop.   Pulmonary:      Effort: Pulmonary effort is normal.      Breath sounds: Normal breath sounds. No wheezing or rhonchi.   Abdominal:      General: Bowel sounds are normal. There is no distension.      Palpations: Abdomen is soft.      Tenderness: There is abdominal tenderness in the epigastric area.   Skin:     General: Skin is warm and dry.   Neurological:      Mental Status: She is alert and oriented to person, place, and time.      Cranial Nerves: No cranial nerve deficit.   Psychiatric:         Mood and Affect: Mood and affect normal.         Behavior: Behavior normal.         Thought Content: Thought content normal.         Judgment: Judgment normal.          Result Review :    The following data was reviewed by Julissa Garcia " BECCA Wilkerson on 04/17/23 at 10:14 EDT:    Common labs        8/22/2022    15:23 2/27/2023    14:30   Common Labs   Glucose 83   88     BUN 18   10     Creatinine 0.94   0.86     Sodium 141   139     Potassium 4.0   4.4     Chloride 101   102     Calcium 9.8   10.2     Albumin 4.90   4.5     Total Bilirubin 0.4   0.6     Alkaline Phosphatase 91   83     AST (SGOT) 26   22     ALT (SGPT) 23   19     WBC 7.35   6.91     Hemoglobin 14.0   14.0     Hematocrit 42.4   41.2     Platelets 244   234     Total Cholesterol 234   255     Triglycerides 108   81     HDL Cholesterol 84   97     LDL Cholesterol  131   145     Hemoglobin A1C 5.80   6.00         XR Chest 2 View    Result Date: 2/4/2023    1. No acute cardiopulmonary disease.     ASTER HEREDIA MD       Electronically Signed and Approved By: ASTER HEREDIA MD on 2/04/2023 at 14:21               Data reviewed: Consultant notes From recent visit with gastroenterology regarding IBS with predominant constipation.  Discussed suggestions from gastroenterology with patient.              Assessment and Plan    Diagnoses and all orders for this visit:    1. Primary hypertension (Primary)  Assessment & Plan:  Hypertension is unchanged.  Continue current treatment regimen.  Dietary sodium restriction.  Continue current medications.  Ambulatory blood pressure monitoring.  Blood pressure will be reassessed in 3 months.      2. Mixed hyperlipidemia  Assessment & Plan:  Lipid abnormalities are worsening.  Nutritional counseling was provided. and Pharmacotherapy as ordered.  Lipids will be reassessed in 3 months.      3. Prediabetes    4. Need for pneumococcal vaccine  -     Pneumococcal Conjugate Vaccine 20-Valent (PCV20)    5. Irritable bowel syndrome with constipation  Comments:  Advised to continue use of Linzess as prescribed by gastroenterology and incorporate use of MiraLAX once daily for treatment of constipation.    Other orders  -     vitamin D (ERGOCALCIFEROL) 1.25 MG  (15261 UT) capsule capsule; Take 1 capsule by mouth 1 (One) Time Per Week.  Dispense: 5 capsule; Refill: 10  -     vitamin B-12 (CYANOCOBALAMIN) 100 MCG tablet; Take 0.5 tablets by mouth Daily.  Dispense: 90 tablet; Refill: 1     Discussed dietary changes with patient to help with prevention of diabetes and elevated lipids.  She is agreeable to taking Vytorin although she does report in the past she had some increased pain and leg cramps with use of statin medication.  If this occurs she will notify me via MyChart and an alternative medication will be prescribed, as she was previously tolerating Zetia.  Patient will follow-up with me in approximately 3 months.  This will be after EGD and colonoscopy so results can be reviewed and ongoing gastric distress will be further monitored and discussed at that time.  Patient refers to have labs drawn at the time of her follow-up visit.        Smoking Cessation:    Larissa Donaldson  reports that she has never smoked. She has never used smokeless tobacco.            Follow Up   Return in about 3 months (around 7/17/2023).  Patient was given instructions and counseling regarding her condition or for health maintenance advice. Please see specific information pulled into the AVS if appropriate.     Please note that portions of this note were completed with a voice recognition program.    Answers for HPI/ROS submitted by the patient on 4/16/2023  Please describe your symptoms.: Check up  Have you had these symptoms before?: Yes  How long have you been having these symptoms?: Greater than 2 weeks  What is the primary reason for your visit?: Other

## 2023-04-17 NOTE — ASSESSMENT & PLAN NOTE
Hypertension is unchanged.  Continue current treatment regimen.  Dietary sodium restriction.  Continue current medications.  Ambulatory blood pressure monitoring.  Blood pressure will be reassessed in 3 months.

## 2023-05-16 DIAGNOSIS — I10 PRIMARY HYPERTENSION: ICD-10-CM

## 2023-05-16 RX ORDER — LOSARTAN POTASSIUM AND HYDROCHLOROTHIAZIDE 12.5; 1 MG/1; MG/1
1 TABLET ORAL DAILY
Qty: 90 TABLET | Refills: 0 | Status: SHIPPED | OUTPATIENT
Start: 2023-05-16

## 2023-05-17 NOTE — PRE-PROCEDURE INSTRUCTIONS
Left message regarding appointment date and arrival time.  Instructed to have a  over age 18 to drive them home. Instructed to have clear liquids the day prior to procedure and to call if any questions regarding colon prep.  Do not take medications the day of procedure, but bring them.  Gave number to call for questions or concerns.

## 2023-05-18 NOTE — PAT
Reviewed the following with patient.    Arrival time of 1130.    Must have  over 18 for transportation home post procedure.    Education provided on laxative administration; bowel prep to be taken in two doses. Reviewed diet instructions for day prior to procedure. Only plain, unflavored water after midnight until two hours prior to arrival time.     Do not take any morning medications on the day of the procedure. Instead bring all prescribed medication and inhalers to the hospital the morning of the procedure. May take any nebulizer treatments or inhalers the morning of the procedure.     Pt verbalized understanding of instructions.

## 2023-05-22 ENCOUNTER — ANESTHESIA (OUTPATIENT)
Dept: GASTROENTEROLOGY | Facility: HOSPITAL | Age: 58
End: 2023-05-22
Payer: COMMERCIAL

## 2023-05-22 ENCOUNTER — HOSPITAL ENCOUNTER (OUTPATIENT)
Facility: HOSPITAL | Age: 58
Setting detail: HOSPITAL OUTPATIENT SURGERY
Discharge: HOME | End: 2023-05-22
Attending: INTERNAL MEDICINE | Admitting: INTERNAL MEDICINE
Payer: COMMERCIAL

## 2023-05-22 ENCOUNTER — ANESTHESIA EVENT (OUTPATIENT)
Dept: GASTROENTEROLOGY | Facility: HOSPITAL | Age: 58
End: 2023-05-22
Payer: COMMERCIAL

## 2023-05-22 ENCOUNTER — TELEPHONE (OUTPATIENT)
Dept: GASTROENTEROLOGY | Facility: CLINIC | Age: 58
End: 2023-05-22

## 2023-05-22 VITALS
HEART RATE: 56 BPM | HEIGHT: 69 IN | OXYGEN SATURATION: 98 % | BODY MASS INDEX: 30.43 KG/M2 | SYSTOLIC BLOOD PRESSURE: 112 MMHG | WEIGHT: 205.47 LBS | RESPIRATION RATE: 18 BRPM | TEMPERATURE: 97.6 F | DIASTOLIC BLOOD PRESSURE: 66 MMHG

## 2023-05-22 DIAGNOSIS — K59.04 CHRONIC IDIOPATHIC CONSTIPATION: ICD-10-CM

## 2023-05-22 DIAGNOSIS — R63.4 WEIGHT LOSS: ICD-10-CM

## 2023-05-22 DIAGNOSIS — R10.13 EPIGASTRIC PAIN: ICD-10-CM

## 2023-05-22 DIAGNOSIS — K62.5 RECTAL BLEEDING: ICD-10-CM

## 2023-05-22 PROCEDURE — 88305 TISSUE EXAM BY PATHOLOGIST: CPT | Performed by: INTERNAL MEDICINE

## 2023-05-22 PROCEDURE — 25010000002 PROPOFOL 10 MG/ML EMULSION: Performed by: NURSE ANESTHETIST, CERTIFIED REGISTERED

## 2023-05-22 RX ORDER — PROPOFOL 10 MG/ML
VIAL (ML) INTRAVENOUS AS NEEDED
Status: DISCONTINUED | OUTPATIENT
Start: 2023-05-22 | End: 2023-05-22 | Stop reason: SURG

## 2023-05-22 RX ORDER — LIDOCAINE HYDROCHLORIDE 20 MG/ML
INJECTION, SOLUTION EPIDURAL; INFILTRATION; INTRACAUDAL; PERINEURAL AS NEEDED
Status: DISCONTINUED | OUTPATIENT
Start: 2023-05-22 | End: 2023-05-22 | Stop reason: SURG

## 2023-05-22 RX ORDER — SODIUM CHLORIDE, SODIUM LACTATE, POTASSIUM CHLORIDE, CALCIUM CHLORIDE 600; 310; 30; 20 MG/100ML; MG/100ML; MG/100ML; MG/100ML
30 INJECTION, SOLUTION INTRAVENOUS CONTINUOUS
Status: DISCONTINUED | OUTPATIENT
Start: 2023-05-22 | End: 2023-05-22 | Stop reason: HOSPADM

## 2023-05-22 RX ORDER — POLYETHYLENE GLYCOL 3350, SODIUM SULFATE, SODIUM CHLORIDE, POTASSIUM CHLORIDE, ASCORBIC ACID, SODIUM ASCORBATE 140-9-5.2G
1 KIT ORAL TAKE AS DIRECTED
Qty: 1 EACH | Refills: 0 | COMMUNITY
Start: 2023-05-22 | End: 2023-05-23

## 2023-05-22 RX ADMIN — PROPOFOL 50 MG: 10 INJECTION, EMULSION INTRAVENOUS at 13:09

## 2023-05-22 RX ADMIN — PROPOFOL 200 MCG/KG/MIN: 10 INJECTION, EMULSION INTRAVENOUS at 13:09

## 2023-05-22 RX ADMIN — LIDOCAINE HYDROCHLORIDE 100 MG: 20 INJECTION, SOLUTION EPIDURAL; INFILTRATION; INTRACAUDAL; PERINEURAL at 13:09

## 2023-05-22 RX ADMIN — SODIUM CHLORIDE, POTASSIUM CHLORIDE, SODIUM LACTATE AND CALCIUM CHLORIDE: 600; 310; 30; 20 INJECTION, SOLUTION INTRAVENOUS at 13:08

## 2023-05-22 NOTE — ANESTHESIA PREPROCEDURE EVALUATION
Anesthesia Evaluation     Patient summary reviewed and Nursing notes reviewed                Airway   Mallampati: I  TM distance: >3 FB  Neck ROM: full  No difficulty expected  Dental          Pulmonary - normal exam    breath sounds clear to auscultation  (+) asthma,  Cardiovascular - normal exam    Rhythm: regular  Rate: normal    (+) hypertension, hyperlipidemia,       Neuro/Psych  (+) headaches,    GI/Hepatic/Renal/Endo    (+) obesity,  GERD, GI bleeding ,     Musculoskeletal     Abdominal    Substance History - negative use     OB/GYN negative ob/gyn ROS         Other   arthritis,                      Anesthesia Plan    ASA 3     general     intravenous induction     Anesthetic plan, risks, benefits, and alternatives have been provided, discussed and informed consent has been obtained with: patient.        CODE STATUS:

## 2023-05-22 NOTE — H&P
Pre Procedure History & Physical    Chief Complaint:   Epigastric pain, heartburn, rectal bleeding    Subjective     HPI:   Gastric pain, heartburn, rectal bleeding    Past Medical History:   Past Medical History:   Diagnosis Date   • Abnormal Pap smear of cervix    • Allergic    • Arthritis    • Asthma    • Diverticulosis    • GERD (gastroesophageal reflux disease)    • High cholesterol    • Hypertension    • Low back pain    • Migraine    • Pneumonia    • Scoliosis        Past Surgical History:  Past Surgical History:   Procedure Laterality Date   • COLONOSCOPY  2019   • WISDOM TOOTH EXTRACTION         Family History:  Family History   Problem Relation Age of Onset   • Hyperlipidemia Mother         Both parents   • Hypertension Mother         Both parents   • Stroke Mother    • Vision loss Mother    • Arthritis Father    • Diabetes Father    • Asthma Daughter    • Miscarriages / Stillbirths Daughter            • Breast cancer Neg Hx    • Ovarian cancer Neg Hx    • Uterine cancer Neg Hx    • Colon cancer Neg Hx    • Melanoma Neg Hx        Social History:   reports that she has never smoked. She has never used smokeless tobacco. She reports current alcohol use of about 2.0 standard drinks per week. She reports that she does not use drugs.    Medications:   Medications Prior to Admission   Medication Sig Dispense Refill Last Dose   • albuterol sulfate  (90 Base) MCG/ACT inhaler albuterol sulfate HFA 90 mcg/actuation aerosol inhaler   Past Week   • Diclofenac Sodium (VOLTAREN) 1 % gel gel Apply 4 g topically to the appropriate area as directed 4 (Four) Times a Day As Needed.   2023   • ezetimibe-simvastatin (Vytorin) 10-40 MG per tablet Take 1 tablet by mouth Every Night. 90 tablet 1 2023   • fluticasone (FLONASE) 50 MCG/ACT nasal spray fluticasone propionate 50 mcg/actuation nasal spray,suspension   2023   • fluticasone-salmeterol (ADVAIR) 500-50 MCG/DOSE DISKUS  "Advair Diskus 500 mcg-50 mcg/dose powder for inhalation   INHALE 1 PUFF BY MOUTH TWICE A DAY   5/22/2023   • hydrOXYzine (ATARAX) 25 MG tablet hydroxyzine HCl 25 mg tablet   5/21/2023   • levocetirizine (XYZAL) 5 MG tablet levocetirizine 5 mg tablet   5/21/2023   • Linzess 72 MCG capsule capsule Take 1 capsule by mouth Daily.   5/21/2023   • losartan-hydrochlorothiazide (HYZAAR) 100-12.5 MG per tablet TAKE 1 TABLET BY MOUTH DAILY. CONTACT YOUR PRIMARY CARE PROVIDER FOR ANY REFILL TOMORROW. 90 tablet 0 5/21/2023   • Nurtec 75 MG dispersible tablet Take 1 tablet by mouth Every Other Day.   5/21/2023   • omeprazole (priLOSEC) 40 MG capsule Take 1 capsule by mouth Daily for 90 days. 90 capsule 0 5/21/2023   • traMADol (ULTRAM) 50 MG tablet Every 12 (Twelve) Hours.   5/21/2023   • vitamin B-12 (CYANOCOBALAMIN) 100 MCG tablet Take 0.5 tablets by mouth Daily. 90 tablet 1 5/21/2023   • vitamin D (ERGOCALCIFEROL) 1.25 MG (37394 UT) capsule capsule Take 1 capsule by mouth 1 (One) Time Per Week. 5 capsule 10 5/21/2023   • Tiotropium Bromide Monohydrate (Spiriva Respimat) 1.25 MCG/ACT aerosol solution inhaler Spiriva Respimat 1.25 mcg/actuation solution for inhalation   INHALE 2 PUFFS BY MOUTH ONCE DAILY   Unknown       Allergies:  Patient has no known allergies.        Objective     Blood pressure 114/67, pulse 54, temperature 97.9 °F (36.6 °C), temperature source Temporal, resp. rate 20, height 175.3 cm (69\"), weight 93.2 kg (205 lb 7.5 oz), SpO2 100 %, not currently breastfeeding.    Physical Exam   Constitutional: Pt is oriented to person, place, and time and well-developed, well-nourished, and in no distress.   Mouth/Throat: Oropharynx is clear and moist.   Neck: Normal range of motion.   Cardiovascular: Normal rate, regular rhythm and normal heart sounds.    Pulmonary/Chest: Effort normal and breath sounds normal.   Abdominal: Soft. Nontender  Skin: Skin is warm and dry.   Psychiatric: Mood, memory, affect and judgment " normal.     Assessment & Plan     Diagnosis:  Epigastric pain, heartburn, rectal bleed    Anticipated Surgical Procedure:  EGD and colonoscopy    The risks, benefits, and alternatives of this procedure have been discussed with the patient or the responsible party- the patient understands and agrees to proceed.

## 2023-05-22 NOTE — ANESTHESIA POSTPROCEDURE EVALUATION
Patient: Larissa Donaldson    Procedure Summary     Date: 05/22/23 Room / Location: [facility] ENDOSCOPY 3 / [facility] ENDOSCOPY    Anesthesia Start: 1308 Anesthesia Stop: 1353    Procedures:       ESOPHAGOGASTRODUODENOSCOPY WITH BIOPSES      COLONOSCOPY Diagnosis:       Epigastric pain      Rectal bleeding      Weight loss      Chronic idiopathic constipation      (Epigastric pain [R10.13])      (Rectal bleeding [K62.5])      (Weight loss [R63.4])      (Chronic idiopathic constipation [K59.04])    Surgeons: Alan Cespedes MD Provider: Maru Street CRNA    Anesthesia Type: general ASA Status: 3          Anesthesia Type: general    Vitals  No vitals data found for the desired time range.          Post Anesthesia Care and Evaluation    Patient location during evaluation: bedside  Patient participation: complete - patient participated  Level of consciousness: awake  Pain management: adequate    Airway patency: patent  PONV Status: none  Cardiovascular status: acceptable  Respiratory status: acceptable  Hydration status: acceptable    Comments: An Anesthesiologist personally participated in the most demanding procedures (including induction and emergence if applicable) in the anesthesia plan, monitored the course of anesthesia administration at frequent intervals and remained physically present and available for immediate diagnosis and treatment of emergencies.

## 2023-05-24 LAB
CYTO UR: NORMAL
LAB AP CASE REPORT: NORMAL
LAB AP CLINICAL INFORMATION: NORMAL
PATH REPORT.FINAL DX SPEC: NORMAL
PATH REPORT.GROSS SPEC: NORMAL

## 2023-05-25 ENCOUNTER — TELEPHONE (OUTPATIENT)
Dept: GASTROENTEROLOGY | Facility: CLINIC | Age: 58
End: 2023-05-25
Payer: COMMERCIAL

## 2023-05-25 NOTE — TELEPHONE ENCOUNTER
----- Message from BECCA Ward sent at 5/24/2023  4:04 PM EDT -----  EGD 5/22/2023: Medium size hiatal hernia, normal esophagus, normal stomach-biopsy negative and normal duodenum    Keep follow-up next month

## 2023-05-25 NOTE — TELEPHONE ENCOUNTER
Called pt, no answer, left message for pt to call back.    Postponing result until tomorrow 5.26.23

## 2023-05-26 ENCOUNTER — TELEPHONE (OUTPATIENT)
Dept: GASTROENTEROLOGY | Facility: CLINIC | Age: 58
End: 2023-05-26
Payer: COMMERCIAL

## 2023-05-26 NOTE — TELEPHONE ENCOUNTER
S/w patient, aware of results.     Patient scheduled for a f/u 06/05/2023. Patient aware  ----- Message from BECCA Ward sent at 5/24/2023  4:04 PM EDT -----  EGD 5/22/2023: Medium size hiatal hernia, normal esophagus, normal stomach-biopsy negative and normal duodenum    Keep follow-up next month

## 2023-06-05 ENCOUNTER — OFFICE VISIT (OUTPATIENT)
Dept: GASTROENTEROLOGY | Facility: CLINIC | Age: 58
End: 2023-06-05
Payer: COMMERCIAL

## 2023-06-05 VITALS
HEIGHT: 69 IN | DIASTOLIC BLOOD PRESSURE: 67 MMHG | BODY MASS INDEX: 31.4 KG/M2 | WEIGHT: 212 LBS | SYSTOLIC BLOOD PRESSURE: 126 MMHG | HEART RATE: 58 BPM

## 2023-06-05 DIAGNOSIS — K44.9 HIATAL HERNIA: ICD-10-CM

## 2023-06-05 DIAGNOSIS — R63.4 WEIGHT LOSS: ICD-10-CM

## 2023-06-05 DIAGNOSIS — K59.04 CHRONIC IDIOPATHIC CONSTIPATION: ICD-10-CM

## 2023-06-05 DIAGNOSIS — R10.13 EPIGASTRIC PAIN: Primary | ICD-10-CM

## 2023-06-05 DIAGNOSIS — K57.90 DIVERTICULOSIS: ICD-10-CM

## 2023-06-05 PROBLEM — M54.9 INTRACTABLE BACK PAIN: Status: ACTIVE | Noted: 2019-01-25

## 2023-06-05 PROBLEM — M17.9 OSTEOARTHRITIS OF KNEE: Status: ACTIVE | Noted: 2023-06-05

## 2023-06-05 PROBLEM — M19.90 ARTHRITIS: Status: ACTIVE | Noted: 2023-06-05

## 2023-06-05 PROBLEM — R93.5 ABNORMAL CT OF THE ABDOMEN: Status: ACTIVE | Noted: 2019-01-25

## 2023-06-05 PROBLEM — E66.3 OVERWEIGHT: Status: ACTIVE | Noted: 2022-11-18

## 2023-06-05 PROBLEM — M19.071 OSTEOARTHRITIS OF BOTH ANKLES: Status: ACTIVE | Noted: 2023-06-05

## 2023-06-05 PROBLEM — M51.369 DEGENERATION OF LUMBAR INTERVERTEBRAL DISC: Status: ACTIVE | Noted: 2023-06-05

## 2023-06-05 PROBLEM — M51.36 DEGENERATION OF LUMBAR INTERVERTEBRAL DISC: Status: ACTIVE | Noted: 2023-06-05

## 2023-06-05 PROBLEM — M19.072 OSTEOARTHRITIS OF BOTH ANKLES: Status: ACTIVE | Noted: 2023-06-05

## 2023-06-05 PROBLEM — M25.562 LEFT KNEE PAIN: Status: ACTIVE | Noted: 2023-06-05

## 2023-06-05 PROBLEM — D64.9 ANEMIA: Status: ACTIVE | Noted: 2023-06-05

## 2023-06-05 PROBLEM — D25.9 UTERINE FIBROID: Status: ACTIVE | Noted: 2019-01-25

## 2023-06-05 PROBLEM — Z79.4 ENCOUNTER FOR LONG-TERM (CURRENT) USE OF INSULIN: Status: ACTIVE | Noted: 2022-11-18

## 2023-06-05 PROCEDURE — 99214 OFFICE O/P EST MOD 30 MIN: CPT | Performed by: NURSE PRACTITIONER

## 2023-06-05 RX ORDER — UBIDECARENONE 100 MG
100 CAPSULE ORAL DAILY
COMMUNITY

## 2023-06-05 RX ORDER — OMEPRAZOLE 40 MG/1
40 CAPSULE, DELAYED RELEASE ORAL DAILY
Qty: 90 CAPSULE | Refills: 0 | Status: SHIPPED | OUTPATIENT
Start: 2023-06-05 | End: 2023-09-03

## 2023-07-10 ENCOUNTER — TELEPHONE (OUTPATIENT)
Dept: FAMILY MEDICINE CLINIC | Facility: CLINIC | Age: 58
End: 2023-07-10

## 2023-07-10 NOTE — TELEPHONE ENCOUNTER
Caller: Larissa Donaldson    Relationship: Self    Best call back number: [phone]     What is the best time to reach you: ANYTIME     Who are you requesting to speak with (clinical staff, provider,  specific staff member): CLINICAL       What was the call regarding: PATIENT IS CALLING TO SEE ABOUT POSSIBLE TO HAVE LABS DRAWN AT OFFICE AT GASTROLOGY PROVIDER AS WELL, OR IF ACTIVE LAB ORDER TO BE PLACE FOR PATIENT FOR UPCOMING APPOINTMENT.

## 2023-07-10 NOTE — TELEPHONE ENCOUNTER
HUB TO READ  Attempted to call PT to inform her that gastro labs are unable to be drawn here at the clinic. PT will need to go to out patient lab at the hospital or at the [facility]

## 2023-07-13 NOTE — TELEPHONE ENCOUNTER
Caller: Larissa Donaldson A    Relationship to patient: Self    Best call back number: [phone]    Patient is needing: READ THE HUB TO READ, BUT THE PATIENT STATED SHE IS CURRENTLY AT [facility] AND THERE ARE NO LAB ORDERS FROM OUR OFFICE. PATIENT STATED THAT SHE SPOKE WITH SOMEONE IN THE OFFICE AND THAT SHE WAS GOING TO HAVE LABS DONE FOR THIS OFFICE AS WELL FOR HER APPOINTMENT ON 7.17.23 AT [facility] WHEN SHE DID HER GASTRO LABS. ATTEMPTED TO TRANSFER, BUT OFFICE STATED THAT [facility] IS OUT OF OFFICE AND THEY CANNOT SEND ORDERS. PATIENT ASKED TO CANCEL HER APPOINTMENT ON MONDAY BECAUSE SHE CANNOT HAVE HER LABS DONE.

## 2023-08-08 DIAGNOSIS — I10 PRIMARY HYPERTENSION: ICD-10-CM

## 2023-08-08 RX ORDER — LOSARTAN POTASSIUM AND HYDROCHLOROTHIAZIDE 12.5; 1 MG/1; MG/1
TABLET ORAL
Qty: 90 TABLET | Refills: 0 | Status: SHIPPED | OUTPATIENT
Start: 2023-08-08

## 2023-08-17 ENCOUNTER — TELEPHONE (OUTPATIENT)
Dept: GASTROENTEROLOGY | Facility: CLINIC | Age: 58
End: 2023-08-17
Payer: COMMERCIAL

## 2023-08-17 DIAGNOSIS — R11.0 NAUSEA: Primary | ICD-10-CM

## 2023-08-17 NOTE — TELEPHONE ENCOUNTER
Rec'd VM from Pt with c/o ABD pain that started on 8.14.23 has improved since then, and has questions on recommendations with Hiatal Hernia/Fatty Liver. Pt also states she was having nausea.     I tried to call pt to get a little more information on symptoms, no answer but left message for pt to call office.

## 2023-08-21 NOTE — TELEPHONE ENCOUNTER
Patient Returned call, states ABD pain is above naval (pt states she feels a knot there). Pain is daily but has improved since 8.14.23 now just having a dull aching pain. Pt states she is having nausea after meals, has had 2 episodes of vomiting since 8.14.23, last BM on 8.19.23 was soft formed stool, denies blood in stool. Please advise

## 2023-08-21 NOTE — TELEPHONE ENCOUNTER
Called pt. No Answer. Left message for pt to call back.    Postponing Patient Call until tomorrow, 8.22.23

## 2023-08-24 NOTE — PROGRESS NOTES
"Well Woman Visit    CC: Scheduled annual well gyn visit  Chief Complaint   Patient presents with    Gynecologic Exam       Prior Myriad or Hereditary Cancer testing (attach copy of result if yes): no, pt did not qualify      HPI:   57 y.o.   Social History     Substance and Sexual Activity   Sexual Activity Not Currently    Partners: Male    Birth control/protection: Post-menopausal     Office Visit with Rin Wilks DO (2022)    Mammo Screening Digital Tomosynthesis Bilateral With CAD (2022 10:19)    IgP, Aptima HPV (2022 15:07)-Pap negative, HPV negative     No VB.  No pelvic pain.  Occas hot flashes, OTC estroven works.     2019 CT scan showed numerous fibroids from uterus- not felt on exam last year.    Pt has no complaints today.    PCP: no recent preventative labs  History: PMHx, Meds, Allergies, PSHx, Social Hx, and POBHx all reviewed and updated.    PHYSICAL EXAM:  /70   Pulse 69   Ht 175.3 cm (69.02\")   Wt 93 kg (205 lb)   SpO2 98%   Breastfeeding No   BMI 30.26 kg/mý  Not found.  General- NAD, alert and oriented, appropriate  Psych- Normal mood, good memory  Neck- No masses, no thyroid enlargement  CV- Regular rhythm, no murnurs  Resp- CTA to bases, no wheezes  Abdomen- Soft, non distended, non tender, no masses    Breast left-  Bilaterally symmetrical, no masses, non tender, no nipple discharge, no axillary or supraclavicular nodes palpable.    Breast right- Bilaterally symmetrical, no masses, non tender, no nipple discharge, no axillary or supraclavicular nodes palpable.      External genitalia- Normal female, no lesions  Urethra/meatus- Normal, no masses, non tender  Bladder- Normal, no masses, non tender  Vagina- Normal, no atrophy, no lesions, no discharge.  Prolapse : none noted, not examined with split speculum to delineate  Cvx- Normal, no lesions, no discharge, No cervical motion tenderness  Uterus- Normal size, shape & consistency.  Non tender, mobile, & no " prolapse, No fibroids palpated.   Adnexa- No mass, non tender  Anus/Rectum/Perineum-  not examined, recent colonoscopy    Lymphatic- No palpable neck, axillary, or groin nodes  Ext- No edema, no cyanosis    Skin- No lesions, no rashes, no acanthosis nigricans      ASSESSMENT and PLAN:    Diagnoses and all orders for this visit:    1. Well woman exam (Primary)  -     Mammo Screening Digital Tomosynthesis Bilateral With CAD; Future  -     Hemoglobin A1c  -     Lipid Panel  -     TSH    2. Hx of abn pap  Overview:  2007 ASCUS +HPV, neg colpo bx  2008 ASCUS +HPV  2010 TRINITY I  2013 and 2011 neg paps  2017 ASCUS HPV neg  2019 ASCUS HPV pos, colpo bx and ECC neg  2020 Neg  2022 Pap neg, HPV neg        3. Elevated blood pressure reading  Comments:  Checks BP at home wnl.  To PCP if persists S 135 or D 85        Preventative:  BREAST HEALTH- Monthly self breast exam importance and how to reviewed. MMG and/or MRI (prn) reviewed per society guidelines and her individual history. Screen: Updated today  CERVICAL CANCER Screening- Reviewed current ASCCP guidelines for screening w and wo cotest HPV, age specific.  Screen: Already up to date  COLON CANCER Screening- Reviewed current medical society guidelines and options.  Screen:  Already up to date  Importance of WEIGHT MANAGEMENT, nutrition, and exercise reviewed  BONE HEALTH- Reviewed current medical society guidelines and options for testing, calcium and vit D intake.  Weight bearing exercise.  DEXA: Not medically needed  VACCINATIONS Recommended: COVID and booster PRN, Flu annually, Tdap [id number], Zoster (51yo and older).  Importance discussed, risk being unvaccinated reviewed.  Questions answered  Smoking status- NON SMOKER  Follow up PCP/Specialist PMHx and Labs        She understands the importance of having any ordered tests to be performed in a timely fashion.  The risks of not performing them include, but are not limited to, advanced cancer stages, bone loss from  osteoporosis and/or subsequent increase in morbidity and/or mortality.  She is encouraged to review her results online and/or contact or office if she has questions.     Follow Up:  Return in about 1 year (around 8/25/2024) for WWE.            Rin Wilks,   08/25/2023    [facility]  [facility] [place] [address]  Dept: [phone]  Dept Fax: [phone]  Loc: [phone]  Loc Fax: [phone]

## 2023-08-25 ENCOUNTER — OFFICE VISIT (OUTPATIENT)
Dept: OBSTETRICS AND GYNECOLOGY | Facility: CLINIC | Age: 58
End: 2023-08-25
Payer: COMMERCIAL

## 2023-08-25 VITALS
SYSTOLIC BLOOD PRESSURE: 140 MMHG | OXYGEN SATURATION: 98 % | HEIGHT: 69 IN | BODY MASS INDEX: 30.36 KG/M2 | DIASTOLIC BLOOD PRESSURE: 70 MMHG | HEART RATE: 69 BPM | WEIGHT: 205 LBS

## 2023-08-25 DIAGNOSIS — R03.0 ELEVATED BLOOD PRESSURE READING: ICD-10-CM

## 2023-08-25 DIAGNOSIS — Z01.419 WELL WOMAN EXAM: Primary | ICD-10-CM

## 2023-08-25 DIAGNOSIS — R87.618 OTHER ABNORMAL CYTOLOGICAL FINDING OF SPECIMEN FROM CERVIX: ICD-10-CM

## 2023-08-25 LAB
CHOLEST SERPL-MCNC: 172 MG/DL (ref 0–200)
HBA1C MFR BLD: 6.1 % (ref 4.8–5.6)
HDLC SERPL-MCNC: 87 MG/DL (ref 40–60)
LDLC SERPL CALC-MCNC: 77 MG/DL (ref 0–100)
LDLC/HDLC SERPL: 0.9 {RATIO}
TRIGL SERPL-MCNC: 35 MG/DL (ref 0–150)
TSH SERPL DL<=0.05 MIU/L-ACNC: 0.44 UIU/ML (ref 0.27–4.2)
VLDLC SERPL-MCNC: 8 MG/DL (ref 5–40)

## 2023-08-25 PROCEDURE — 80061 LIPID PANEL: CPT | Performed by: OBSTETRICS & GYNECOLOGY

## 2023-08-25 PROCEDURE — 84443 ASSAY THYROID STIM HORMONE: CPT | Performed by: OBSTETRICS & GYNECOLOGY

## 2023-08-25 PROCEDURE — 83036 HEMOGLOBIN GLYCOSYLATED A1C: CPT | Performed by: OBSTETRICS & GYNECOLOGY

## 2023-08-25 NOTE — PATIENT INSTRUCTIONS
Venipuncture Blood Specimen Collection  Venipuncture performed in right hand by Gracia Gonzalez with good hemostasis. Patient tolerated the procedure well without complications.   08/25/23   Gracia Gonzalez

## 2023-08-28 ENCOUNTER — TELEPHONE (OUTPATIENT)
Dept: OBSTETRICS AND GYNECOLOGY | Facility: CLINIC | Age: 58
End: 2023-08-28
Payer: COMMERCIAL

## 2023-08-28 NOTE — TELEPHONE ENCOUNTER
----- Message from Rin Wilks DO sent at 8/26/2023  3:15 PM EDT -----  Hemoglobin A1c is consistent with a higher risk for diabetes.  Ideally it should be less than 5.6 and hers was 6.1.  This is 0.1 higher than 6 months ago and 0.3 higher than a year ago.  Please assist patient with obtaining evaluation with PCP to further follow-up.  In the meantime recommend a low carb high-protein diet along with continued weight loss.

## 2023-09-05 ENCOUNTER — OFFICE VISIT (OUTPATIENT)
Dept: GASTROENTEROLOGY | Facility: CLINIC | Age: 58
End: 2023-09-05
Payer: COMMERCIAL

## 2023-09-05 ENCOUNTER — TELEPHONE (OUTPATIENT)
Dept: GASTROENTEROLOGY | Facility: CLINIC | Age: 58
End: 2023-09-05

## 2023-09-05 VITALS
HEART RATE: 62 BPM | WEIGHT: 214 LBS | DIASTOLIC BLOOD PRESSURE: 73 MMHG | SYSTOLIC BLOOD PRESSURE: 124 MMHG | BODY MASS INDEX: 31.7 KG/M2 | HEIGHT: 69 IN

## 2023-09-05 DIAGNOSIS — R11.0 NAUSEA: ICD-10-CM

## 2023-09-05 DIAGNOSIS — K76.0 FATTY LIVER: Primary | ICD-10-CM

## 2023-09-05 DIAGNOSIS — K42.9 UMBILICAL HERNIA WITHOUT OBSTRUCTION AND WITHOUT GANGRENE: ICD-10-CM

## 2023-09-05 DIAGNOSIS — K44.9 HIATAL HERNIA: ICD-10-CM

## 2023-09-05 DIAGNOSIS — R10.13 EPIGASTRIC PAIN: ICD-10-CM

## 2023-09-05 DIAGNOSIS — K57.90 DIVERTICULOSIS: ICD-10-CM

## 2023-09-05 DIAGNOSIS — K59.04 CHRONIC IDIOPATHIC CONSTIPATION: ICD-10-CM

## 2023-09-05 NOTE — PATIENT INSTRUCTIONS
Fatty Liver Disease    The liver converts food into energy, removes toxic material from the blood, makes important proteins, and absorbs necessary vitamins from food. Fatty liver disease occurs when too much fat has built up in your liver cells. Fatty liver disease is also called hepatic steatosis.  In many cases, fatty liver disease does not cause symptoms or problems. It is often diagnosed when tests are being done for other reasons. However, over time, fatty liver can cause inflammation that may lead to more serious liver problems, such as scarring of the liver (cirrhosis) and liver failure.  Fatty liver is associated with insulin resistance, increased body fat, high blood pressure (hypertension), and high cholesterol. These are features of metabolic syndrome and increase your risk for stroke, diabetes, and heart disease.  What are the causes?  This condition may be caused by components of metabolic syndrome:  Obesity.  Insulin resistance.  High cholesterol.  Other causes:  Alcohol abuse.  Poor nutrition.  Cushing syndrome.  Pregnancy.  Certain drugs.  Poisons.  Some viral infections.  What increases the risk?  You are more likely to develop this condition if you:  Abuse alcohol.  Are overweight.  Have diabetes.  Have hepatitis.  Have a high triglyceride level.  Are pregnant.  What are the signs or symptoms?  Fatty liver disease often does not cause symptoms. If symptoms do develop, they can include:  Fatigue and weakness.  Weight loss.  Confusion.  Nausea, vomiting, or abdominal pain.  Yellowing of your skin and the white parts of your eyes (jaundice).  Itchy skin.  How is this diagnosed?  This condition may be diagnosed by:  A physical exam and your medical history.  Blood tests.  Imaging tests, such as an ultrasound, CT scan, or MRI.  A liver biopsy. A small sample of liver tissue is removed using a needle. The sample is then looked at under a microscope.  How is this treated?  Fatty liver disease is often  caused by other health conditions. Treatment for fatty liver may involve medicines and lifestyle changes to manage conditions such as:  Alcoholism.  High cholesterol.  Diabetes.  Being overweight or obese.  Follow these instructions at home:    Do not drink alcohol. If you have trouble quitting, ask your health care provider how to safely quit with the help of medicine or a supervised program. This is important to keep your condition from getting worse.  Eat a healthy diet as told by your health care provider. Ask your health care provider about working with a dietitian to develop an eating plan.  Exercise regularly. This can help you lose weight and control your cholesterol and diabetes. Talk to your health care provider about an exercise plan and which activities are best for you.  Take over-the-counter and prescription medicines only as told by your health care provider.  Keep all follow-up visits. This is important.  Contact a health care provider if:  You have trouble controlling your:  Blood sugar. This is especially important if you have diabetes.  Cholesterol.  Drinking of alcohol.  Get help right away if:  You have abdominal pain.  You have jaundice.  You have nausea and are vomiting.  You vomit blood or material that looks like coffee grounds.  You have stools that are black, tar-like, or bloody.  Summary  Fatty liver disease develops when too much fat builds up in the cells of your liver.  Fatty liver disease often causes no symptoms or problems. However, over time, fatty liver can cause inflammation that may lead to more serious liver problems, such as scarring of the liver (cirrhosis).  You are more likely to develop this condition if you abuse alcohol, are pregnant, are overweight, have diabetes, have hepatitis, or have high triglyceride or cholesterol levels.  Contact your health care provider if you have trouble controlling your blood sugar, cholesterol, or drinking of alcohol.  This information is  not intended to replace advice given to you by your health care provider. Make sure you discuss any questions you have with your health care provider.  Document Revised: 09/30/2021 Document Reviewed: 09/30/2021  Elsevier Patient Education © 2023 [employer]

## 2023-09-11 ENCOUNTER — HOSPITAL ENCOUNTER (OUTPATIENT)
Dept: CT IMAGING | Facility: HOSPITAL | Age: 58
Discharge: HOME | End: 2023-09-11
Admitting: NURSE PRACTITIONER
Payer: COMMERCIAL

## 2023-09-11 DIAGNOSIS — K42.9 UMBILICAL HERNIA WITHOUT OBSTRUCTION AND WITHOUT GANGRENE: ICD-10-CM

## 2023-09-11 DIAGNOSIS — R10.13 EPIGASTRIC PAIN: ICD-10-CM

## 2023-09-11 LAB
CREAT BLDA-MCNC: 0.9 MG/DL
EGFRCR SERPLBLD CKD-EPI 2021: 74.7 ML/MIN/1.73

## 2023-09-11 PROCEDURE — 74150 CT ABDOMEN W/O CONTRAST: CPT

## 2023-09-11 PROCEDURE — 82565 ASSAY OF CREATININE: CPT

## 2023-09-12 ENCOUNTER — TELEPHONE (OUTPATIENT)
Dept: GASTROENTEROLOGY | Facility: CLINIC | Age: 58
End: 2023-09-12
Payer: COMMERCIAL

## 2023-09-12 NOTE — TELEPHONE ENCOUNTER
Called pt, no answer, left message for pt to call back.    Postponing result until tomorrow 9.13.23

## 2023-09-12 NOTE — TELEPHONE ENCOUNTER
----- Message from BECCA Ward sent at 9/12/2023  2:03 PM EDT -----  Please notify patient CT confirms umbilical hernia, and does not appear to be containing any bowel at this time, however if patient would like to discuss repair with surgeon as she reported noticing pain after lifting I can create a referral

## 2023-10-03 RX ORDER — EZETIMIBE AND SIMVASTATIN 10; 40 MG/1; MG/1
TABLET ORAL
Qty: 30 TABLET | Refills: 0 | Status: SHIPPED | OUTPATIENT
Start: 2023-10-03

## 2023-10-21 DIAGNOSIS — I10 PRIMARY HYPERTENSION: ICD-10-CM

## 2023-10-23 RX ORDER — LOSARTAN POTASSIUM AND HYDROCHLOROTHIAZIDE 12.5; 1 MG/1; MG/1
TABLET ORAL
Qty: 90 TABLET | Refills: 0 | Status: SHIPPED | OUTPATIENT
Start: 2023-10-23

## 2023-10-23 RX ORDER — EZETIMIBE AND SIMVASTATIN 10; 40 MG/1; MG/1
TABLET ORAL
Qty: 30 TABLET | Refills: 0 | Status: SHIPPED | OUTPATIENT
Start: 2023-10-23

## 2023-11-06 ENCOUNTER — OFFICE VISIT (OUTPATIENT)
Dept: GASTROENTEROLOGY | Facility: CLINIC | Age: 58
End: 2023-11-06
Payer: COMMERCIAL

## 2023-11-06 VITALS
HEIGHT: 69 IN | SYSTOLIC BLOOD PRESSURE: 136 MMHG | DIASTOLIC BLOOD PRESSURE: 69 MMHG | WEIGHT: 209.4 LBS | BODY MASS INDEX: 31.01 KG/M2 | HEART RATE: 74 BPM

## 2023-11-06 DIAGNOSIS — K42.9 UMBILICAL HERNIA WITHOUT OBSTRUCTION AND WITHOUT GANGRENE: ICD-10-CM

## 2023-11-06 DIAGNOSIS — K44.9 HIATAL HERNIA: ICD-10-CM

## 2023-11-06 DIAGNOSIS — K59.04 CHRONIC IDIOPATHIC CONSTIPATION: ICD-10-CM

## 2023-11-06 DIAGNOSIS — K76.0 FATTY LIVER: Primary | ICD-10-CM

## 2023-11-06 DIAGNOSIS — E66.9 CLASS 1 OBESITY WITH SERIOUS COMORBIDITY AND BODY MASS INDEX (BMI) OF 30.0 TO 30.9 IN ADULT, UNSPECIFIED OBESITY TYPE: ICD-10-CM

## 2023-11-06 PROCEDURE — 99214 OFFICE O/P EST MOD 30 MIN: CPT | Performed by: NURSE PRACTITIONER

## 2023-11-06 RX ORDER — OMEPRAZOLE 20 MG/1
20 CAPSULE, DELAYED RELEASE ORAL DAILY
Qty: 30 CAPSULE | Refills: 5 | Status: SHIPPED | OUTPATIENT
Start: 2023-11-06

## 2023-11-06 RX ORDER — MONTELUKAST SODIUM 10 MG/1
1 TABLET ORAL DAILY
COMMUNITY
Start: 2023-10-26

## 2023-11-06 RX ORDER — DICLOFENAC SODIUM 75 MG/1
TABLET, DELAYED RELEASE ORAL
COMMUNITY
Start: 2023-10-21

## 2023-11-06 NOTE — PATIENT INSTRUCTIONS
Abd binder recommended w lifting, take off if not working/lifting  Cont low carb diet and weight loss, 2-4 c. Coffee/d  Take Linzess w food -- call if not effective enough

## 2023-11-06 NOTE — PROGRESS NOTES
Patient Name: Larissa Donaldson   Visit Date: 11/06/2023   Patient ID: [id number]  Provider: BECCA Ward    Sex: female  Location:  Location Address:  Location Phone: [phone] [place] [address] [phone]    YOB: 1965  Age: 58 y.o.   Primary Care Provider Julissa Wilkerson APRN      Referring Provider: No ref. provider found        Chief Complaint  Fatty Liver , Nausea (Daily, Worse after meals), Abdominal Pain (Epigastric pain has improved, center ABD pain around umbilicus occ), Bloated (Pt states feeling bloated daily after meals, having lots of gas ), and Constipation (Pt states having a BM every other day with straining, hard stool. Pt states from 11/5/23 having loose stool after drinking a new coffee)    History of Present Illness   Patient initially presented 3/6/2023 with abdominal pain worsening over 1 year.  Omeprazole did not help initially, was taking diclofenac and trying to reduce use.  Reported losing 20 pounds due to abdominal pain and avoiding eating.  Also with complaint of constipation was given Linzess and it caused diarrhea so was taking as needed.  BRB seen with straining. Patient was advised to stop NSAIDs, omeprazole increased to 40 mg/day     EGD colonoscopy 5/22/2023: Medium size hiatal hernia, normal esophagus, normal stomach-biopsy negative and normal duodenum good bowel prep, normal colonoscopy, diverticula found in the sigmoid    Gallbladder ultrasound 6/19/2023: Numerous cysts noted within the liver-simple, liver increased in echogenicity compatible with diffuse hepatocellular disease/fatty liver. Gallbladder is unremarkable     Acute hepatitis panel 7/13/2023 negative  7/13/2023 liver enzymes are normal, alk phos normal, bilirubin 0.4, albumin normal at 4.7, INR normal at 0.95     Patient was last seen 9/5/2023, felt a knot above her umbilicus, noticed worse after lifting still constipated with Linzess, tried taking with meal, increase to  145 mcg/day    CT of the abdomen without contrast 2023: Numerous hepatic cyst, fat-containing umbilical hernia-surgeon referral offered, patient wanted to wait until follow-up appointment    Pt states she has still felt constipated with Linzess 145, taking on empty stomach. Frequent gas/flatulence complaint.   Pt states she continues to have intermittent pain at umbilicus, requires lifting at her job at [employer].  Usually no ETOH, she did have some on her birthday.  Pt has lost 5# since last visit, trying to reduce sugars.   Pt requests RF omeprazole today, no HB with this     Past Medical History:   Diagnosis Date    Abnormal Pap smear of cervix     Allergic     Anemia     Arthritis     Asthma     Diverticulosis     GERD (gastroesophageal reflux disease)     High cholesterol     Hypertension     Low back pain     Migraine     Pneumonia     Scoliosis     Umbilical hernia     Uterine fibroid 2019       Past Surgical History:   Procedure Laterality Date    COLONOSCOPY  2019    COLONOSCOPY N/A 2023    Procedure: COLONOSCOPY;  Surgeon: Alan Cespedes MD;  Location: [facility] ENDOSCOPY;  Service: Gastroenterology;  Laterality: N/A;  DIVERTICULOSIS    ENDOSCOPY N/A 2023    Procedure: ESOPHAGOGASTRODUODENOSCOPY WITH BIOPSES;  Surgeon: Alan Cespedes MD;  Location: [facility] ENDOSCOPY;  Service: Gastroenterology;  Laterality: N/A;  HIATAL HERNIA    WISDOM TOOTH EXTRACTION         No Known Allergies    Family History   Problem Relation Age of Onset    Deep vein thrombosis Father         required filter, had been hospitalized    Arthritis Father     Diabetes Father     Hyperlipidemia Mother         Both parents    Hypertension Mother         Both parents    Stroke Mother     Vision loss Mother     Asthma Daughter     Miscarriages / Stillbirths Daughter             Breast cancer Neg Hx     Ovarian cancer Neg Hx     Uterine cancer Neg Hx     Colon cancer Neg Hx      "Melanoma Neg Hx     Pulmonary embolism Neg Hx         Social History     Tobacco Use    Smoking status: Never    Smokeless tobacco: Never   Vaping Use    Vaping Use: Never used   Substance Use Topics    Alcohol use: Yes     Alcohol/week: 2.0 standard drinks of alcohol     Types: 2 Glasses of wine per week     Comment: Red or a special occasions    Drug use: Never       Objective     Vital Signs:   /69 (BP Location: Left arm, Patient Position: Sitting, Cuff Size: Adult)   Pulse 74   Ht 175.3 cm (69.02\")   Wt 95 kg (209 lb 6.4 oz)   BMI 30.91 kg/m²       Physical Exam  Constitutional:       General: The patient is not in acute distress.     Appearance: Normal appearance.   HENT:      Head: Normocephalic and atraumatic.      Nose: Nose normal.   Pulmonary:      Effort: Pulmonary effort is normal. No respiratory distress.   Abdominal:      General: Abdomen is flat.      Palpations: Abdomen is soft. There is no mass.      Tenderness: There is no abdominal tenderness--x mild tenderness in epigastric area. There is no guarding.   Musculoskeletal:      Cervical back: Neck supple.      Right lower leg: No edema.      Left lower leg: No edema.   Skin:     General: Skin is warm and dry.   Neurological:      General: No focal deficit present.      Mental Status: The patient is alert and oriented to person, place, and time.      Gait: Gait normal.   Psychiatric:         Mood and Affect: Mood normal.         Speech: Speech normal.         Behavior: Behavior normal.         Thought Content: Thought content normal.     Result Review :   The following data was reviewed by: BECCA Ward on 11/06/2023:    CBC w/diff          2/27/2023    14:30   CBC w/Diff   WBC 6.91    RBC 4.61    Hemoglobin 14.0    Hematocrit 41.2    MCV 89.4    MCH 30.4    MCHC 34.0    RDW 12.4    Platelets 234    Neutrophil Rel % 64.7    Immature Granulocyte Rel % 0.3    Lymphocyte Rel % 23.6    Monocyte Rel % 7.8    Eosinophil Rel % 3.0 "    Basophil Rel % 0.6      CMP          2/27/2023    14:30 7/13/2023    08:35 9/11/2023    14:49   CMP   Glucose 88      BUN 10      Creatinine 0.86   0.90    EGFR 78.9   74.7    Sodium 139      Potassium 4.4      Chloride 102      Calcium 10.2      Total Protein 7.3  7.6     Albumin 4.5  4.7     Globulin 2.8      Total Bilirubin 0.6  0.4     Alkaline Phosphatase 83  96     AST (SGOT) 22  23     ALT (SGPT) 19  24     Albumin/Globulin Ratio 1.6      BUN/Creatinine Ratio 11.6      Anion Gap 7.4                      Assessment and Plan    Diagnoses and all orders for this visit:    1. Fatty liver (Primary)    2. Umbilical hernia without obstruction and without gangrene    3. Chronic idiopathic constipation    4. Hiatal hernia    5. Class 1 obesity with serious comorbidity and body mass index (BMI) of 30.0 to 30.9 in adult, unspecified obesity type    Other orders  -     linaclotide (Linzess) 145 MCG capsule capsule; Take 1 capsule by mouth Every Morning Before Breakfast.  Dispense: 90 capsule; Refill: 1  -     omeprazole (priLOSEC) 20 MG capsule; Take 1 capsule by mouth Daily.  Dispense: 30 capsule; Refill: 5              Follow Up   Return in about 6 months (around 5/6/2024).  Pt does not want to be referred for surgeon at this time, but states she will call if she changes her mind.  Abd binder recommended w lifting, take off if not working/lifting  Cont low carb diet and weight loss, 2-4 c. Coffee/d  Take Linzess w food -- call if not effective enough  Pt requests RF omeprazole today      Patient was given instructions and counseling regarding her condition or for health maintenance advice. Please see specific information pulled into the AVS if appropriate.

## 2023-11-07 ENCOUNTER — HOSPITAL ENCOUNTER (OUTPATIENT)
Dept: MAMMOGRAPHY | Facility: HOSPITAL | Age: 58
Discharge: HOME | End: 2023-11-07
Admitting: OBSTETRICS & GYNECOLOGY
Payer: COMMERCIAL

## 2023-11-07 DIAGNOSIS — Z01.419 WELL WOMAN EXAM: ICD-10-CM

## 2023-11-07 PROCEDURE — 77063 BREAST TOMOSYNTHESIS BI: CPT

## 2023-11-07 PROCEDURE — 77067 SCR MAMMO BI INCL CAD: CPT

## 2023-11-21 ENCOUNTER — CLINICAL SUPPORT (OUTPATIENT)
Dept: FAMILY MEDICINE CLINIC | Facility: CLINIC | Age: 58
End: 2023-11-21
Payer: COMMERCIAL

## 2023-11-21 DIAGNOSIS — Z23 NEED FOR INFLUENZA VACCINATION: Primary | ICD-10-CM

## 2023-11-21 PROCEDURE — 90686 IIV4 VACC NO PRSV 0.5 ML IM: CPT | Performed by: FAMILY MEDICINE

## 2023-11-21 PROCEDURE — 90471 IMMUNIZATION ADMIN: CPT | Performed by: FAMILY MEDICINE

## 2023-12-04 RX ORDER — EZETIMIBE AND SIMVASTATIN 10; 40 MG/1; MG/1
TABLET ORAL
Qty: 30 TABLET | Refills: 0 | OUTPATIENT
Start: 2023-12-04

## 2024-01-05 DIAGNOSIS — I10 PRIMARY HYPERTENSION: ICD-10-CM

## 2024-01-05 RX ORDER — LOSARTAN POTASSIUM AND HYDROCHLOROTHIAZIDE 12.5; 1 MG/1; MG/1
TABLET ORAL
Qty: 90 TABLET | Refills: 0 | OUTPATIENT
Start: 2024-01-05

## 2024-01-05 RX ORDER — EZETIMIBE AND SIMVASTATIN 10; 40 MG/1; MG/1
TABLET ORAL
Qty: 30 TABLET | Refills: 0 | OUTPATIENT
Start: 2024-01-05

## 2024-03-11 RX ORDER — OMEPRAZOLE 20 MG/1
20 CAPSULE, DELAYED RELEASE ORAL DAILY
Qty: 90 CAPSULE | Refills: 1 | Status: SHIPPED | OUTPATIENT
Start: 2024-03-11

## 2024-03-12 NOTE — PROGRESS NOTES
Patient Name: Larissa Donaldson   Visit Date: 09/05/2023   Patient ID: [id number]  Provider: BECCA Ward    Sex: female  Location:  Location Address:  Location Phone: [address]  [place] [address] [phone]    YOB: 1965  Age: 57 y.o.   Primary Care Provider Julissa Wilkerson APRN      Referring Provider: No ref. provider found        Chief Complaint  Abdominal Pain (Epigastric pain, 2 episodes weekly as well as center ABD )    History of Present Illness    Patient initially presented 3/6/2023 with abdominal pain worsening over 1 year.  Omeprazole did not help initially, was taking diclofenac and trying to reduce use.  Reported losing 20 pounds due to abdominal pain and avoiding eating.  Also with complaint of constipation was given Linzess and it caused diarrhea so was taking as needed.  BRB seen with straining. Patient was advised to stop NSAIDs, omeprazole increased to 40 mg/day     EGD colonoscopy 5/22/2023: Medium size hiatal hernia, normal esophagus, normal stomach-biopsy negative and normal duodenum good bowel prep, normal colonoscopy, diverticula found in the sigmoid  2/27/2023: CMP unremarkable, CBC normal    Patient was last seen 6/5/2023, reported Linzess quit working and added fiber.  Reported avoiding certain foods to avoid abdominal discomfort and reduced portion sizes to avoid pain.  Some nausea, vomiting liquids occasionally but felt better than at initial visit.  Gastric emptying scan recommended, patient wanted to have ultrasound of the gallbladder checked first; advised to take Linzess with food and call back if not effective enough.  Patient had only last 1 pound since initial visit    Gallbladder ultrasound 6/19/2023: Numerous cysts noted within the liver-simple, liver increased in echogenicity compatible with diffuse hepatocellular disease/fatty liver.  Gallbladder is unremarkable    Acute hepatitis panel 7/13/2023 negative  7/13/2023 liver enzymes  Writer left a message for patient to call back.    "are normal, alk phos normal, bilirubin 0.4, albumin normal at 4.7, INR normal at 0.95    Pt states she has felt a knot above her umbilicus, noticed abd pain worsened after lifting - states required by her job. Had pain x 3 days. Used heating pad. States \"knot\" has gotten smaller.   Pt states she still gets nauseated after eating even small things, such as bag of chips. Has went 1 week w/o sx at times.   Rarely has ETOH   Occasionally has abd pain after meals- has improved w smaller portions.   Still constipated with Linzess, tried w meal, having BM about every day but small amount.      Past Medical History:   Diagnosis Date    Abnormal Pap smear of cervix     Allergic     Anemia     Arthritis     Asthma     Diverticulosis     GERD (gastroesophageal reflux disease)     High cholesterol     Hypertension     Low back pain     Migraine     Pneumonia     Scoliosis        Past Surgical History:   Procedure Laterality Date    COLONOSCOPY  2019    COLONOSCOPY N/A 2023    Procedure: COLONOSCOPY;  Surgeon: Alan Cespedes MD;  Location: [facility] ENDOSCOPY;  Service: Gastroenterology;  Laterality: N/A;  DIVERTICULOSIS    ENDOSCOPY N/A 2023    Procedure: ESOPHAGOGASTRODUODENOSCOPY WITH BIOPSES;  Surgeon: Alan Cespedes MD;  Location: [facility] ENDOSCOPY;  Service: Gastroenterology;  Laterality: N/A;  HIATAL HERNIA    WISDOM TOOTH EXTRACTION         No Known Allergies    Family History   Problem Relation Age of Onset    Deep vein thrombosis Father         required filter, had been hospitalized    Arthritis Father     Diabetes Father     Hyperlipidemia Mother         Both parents    Hypertension Mother         Both parents    Stroke Mother     Vision loss Mother     Asthma Daughter     Miscarriages / Stillbirths Daughter             Breast cancer Neg Hx     Ovarian cancer Neg Hx     Uterine cancer Neg Hx     Colon cancer Neg Hx     Melanoma Neg Hx     Pulmonary embolism Neg Hx  " "       Social History     Tobacco Use    Smoking status: Never    Smokeless tobacco: Never   Vaping Use    Vaping Use: Never used   Substance Use Topics    Alcohol use: Yes     Alcohol/week: 2.0 standard drinks     Types: 2 Glasses of wine per week     Comment: Red or a special occasions    Drug use: Never       Objective     Vital Signs:   /73 (BP Location: Left arm, Patient Position: Sitting, Cuff Size: Adult)   Pulse 62   Ht 175.3 cm (69.02\")   Wt 97.1 kg (214 lb)   BMI 31.58 kg/m²       Physical Exam  Constitutional:       General: The patient is not in acute distress.     Appearance: Normal appearance.   HENT:      Head: Normocephalic and atraumatic.      Nose: Nose normal.   Pulmonary:      Effort: Pulmonary effort is normal. No respiratory distress.   Abdominal:      General: Abdomen is flat.      Palpations: Abdomen is soft. There is no mass.      Tenderness: There is no abdominal tenderness x  Pt has tenderness at umbilicus, slight bulge palpable w standing, exam is normal in supine position. There is no guarding.  Musculoskeletal:      Cervical back: Neck supple.      Right lower leg: No edema.      Left lower leg: No edema.   Skin:     General: Skin is warm and dry.   Neurological:      General: No focal deficit present.      Mental Status: The patient is alert and oriented to person, place, and time.      Gait: Gait normal.   Psychiatric:         Mood and Affect: Mood normal.         Speech: Speech normal.         Behavior: Behavior normal.         Thought Content: Thought content normal.     Result Review :   The following data was reviewed by: BECCA Ward on 09/05/2023:    CBC w/diff          2/27/2023    14:30   CBC w/Diff   WBC 6.91    RBC 4.61    Hemoglobin 14.0    Hematocrit 41.2    MCV 89.4    MCH 30.4    MCHC 34.0    RDW 12.4    Platelets 234    Neutrophil Rel % 64.7    Immature Granulocyte Rel % 0.3    Lymphocyte Rel % 23.6    Monocyte Rel % 7.8    Eosinophil Rel % " 3.0    Basophil Rel % 0.6      CMP          2/27/2023    14:30 7/13/2023    08:35   CMP   Glucose 88     BUN 10     Creatinine 0.86     EGFR 78.9     Sodium 139     Potassium 4.4     Chloride 102     Calcium 10.2     Total Protein 7.3  7.6    Albumin 4.5  4.7    Globulin 2.8     Total Bilirubin 0.6  0.4    Alkaline Phosphatase 83  96    AST (SGOT) 22  23    ALT (SGPT) 19  24    Albumin/Globulin Ratio 1.6     BUN/Creatinine Ratio 11.6     Anion Gap 7.4         Acute HEP Panel   Hepatitis B Surface Ag   Date Value Ref Range Status   07/13/2023 Non-Reactive Non-Reactive Final     Hep A IgM   Date Value Ref Range Status   07/13/2023 Non-Reactive Non-Reactive Final     Hep B C IgM   Date Value Ref Range Status   07/13/2023 Non-Reactive Non-Reactive Final     Hepatitis C Ab   Date Value Ref Range Status   07/13/2023 Non-Reactive Non-Reactive Final     PT/INR   Protime   Date Value Ref Range Status   07/13/2023 12.8 11.8 - 14.9 Seconds Final     INR   Date Value Ref Range Status   07/13/2023 0.95 0.86 - 1.15 Final               Assessment and Plan    Diagnoses and all orders for this visit:    1. Fatty liver (Primary)    2. Epigastric pain  -     CT Abdomen With Contrast; Future    3. Nausea    4. Hiatal hernia    5. Umbilical hernia without obstruction and without gangrene  Comments:  suspected  Orders:  -     CT Abdomen With Contrast; Future    6. Diverticulosis    7. Chronic idiopathic constipation    Other orders  -     linaclotide (Linzess) 145 MCG capsule capsule; Take 1 capsule by mouth Every Morning Before Breakfast.  Dispense: 90 capsule; Refill: 1              Follow Up   Return for f/u after testing.  Discussed with patient importance of weight loss with 10% weight loss goal, low-carb diet reviewed, and the benefit of 2-4 c. Coffee/d discussed.  Written information given to patient today.  Increase Linzess to 145 mcg/d  pt does not want to do GES at this time - may cancel GES order  CT abd - suspect umbilical  hernia -please do within 7-10 days  If any acute pain occurs again, pt advised to go to ER, avoid lifting.     Patient was given instructions and counseling regarding her condition or for health maintenance advice. Please see specific information pulled into the AVS if appropriate.

## 2024-04-06 DIAGNOSIS — I10 PRIMARY HYPERTENSION: ICD-10-CM

## 2024-04-08 RX ORDER — ERGOCALCIFEROL 1.25 MG/1
50000 CAPSULE ORAL
Qty: 12 CAPSULE | Refills: 4 | OUTPATIENT
Start: 2024-04-08

## 2024-04-08 RX ORDER — EZETIMIBE AND SIMVASTATIN 10; 40 MG/1; MG/1
TABLET ORAL
Qty: 30 TABLET | Refills: 0 | OUTPATIENT
Start: 2024-04-08

## 2024-04-08 RX ORDER — LOSARTAN POTASSIUM AND HYDROCHLOROTHIAZIDE 12.5; 1 MG/1; MG/1
TABLET ORAL
Qty: 90 TABLET | Refills: 0 | OUTPATIENT
Start: 2024-04-08

## 2024-04-24 ENCOUNTER — HOSPITAL ENCOUNTER (EMERGENCY)
Facility: HOSPITAL | Age: 59
Discharge: HOME | End: 2024-04-24
Attending: EMERGENCY MEDICINE
Payer: COMMERCIAL

## 2024-04-24 ENCOUNTER — APPOINTMENT (OUTPATIENT)
Facility: HOSPITAL | Age: 59
End: 2024-04-24
Payer: COMMERCIAL

## 2024-04-24 VITALS
WEIGHT: 204.81 LBS | OXYGEN SATURATION: 100 % | DIASTOLIC BLOOD PRESSURE: 63 MMHG | RESPIRATION RATE: 18 BRPM | BODY MASS INDEX: 30.33 KG/M2 | HEIGHT: 69 IN | HEART RATE: 69 BPM | TEMPERATURE: 98.3 F | SYSTOLIC BLOOD PRESSURE: 138 MMHG

## 2024-04-24 DIAGNOSIS — I80.02 THROMBOPHLEBITIS OF SUPERFICIAL VEINS OF LEFT LOWER EXTREMITY: Primary | ICD-10-CM

## 2024-04-24 PROBLEM — I80.9 PHLEBITIS: Status: ACTIVE | Noted: 2024-04-24

## 2024-04-24 LAB
BH CV LOW VAS LEFT VARICOSITY BK VESSEL: 1
BH CV LOWER VASCULAR LEFT COMMON FEMORAL AUGMENT: NORMAL
BH CV LOWER VASCULAR LEFT COMMON FEMORAL COMPETENT: NORMAL
BH CV LOWER VASCULAR LEFT COMMON FEMORAL COMPRESS: NORMAL
BH CV LOWER VASCULAR LEFT COMMON FEMORAL PHASIC: NORMAL
BH CV LOWER VASCULAR LEFT COMMON FEMORAL SPONT: NORMAL
BH CV LOWER VASCULAR LEFT DISTAL FEMORAL COMPRESS: NORMAL
BH CV LOWER VASCULAR LEFT GASTRONEMIUS COMPRESS: NORMAL
BH CV LOWER VASCULAR LEFT GREATER SAPH AK COMPRESS: NORMAL
BH CV LOWER VASCULAR LEFT GREATER SAPH BK COMPRESS: NORMAL
BH CV LOWER VASCULAR LEFT LESSER SAPH COMPRESS: NORMAL
BH CV LOWER VASCULAR LEFT MID FEMORAL AUGMENT: NORMAL
BH CV LOWER VASCULAR LEFT MID FEMORAL COMPETENT: NORMAL
BH CV LOWER VASCULAR LEFT MID FEMORAL COMPRESS: NORMAL
BH CV LOWER VASCULAR LEFT MID FEMORAL PHASIC: NORMAL
BH CV LOWER VASCULAR LEFT MID FEMORAL SPONT: NORMAL
BH CV LOWER VASCULAR LEFT PERONEAL COMPRESS: NORMAL
BH CV LOWER VASCULAR LEFT POPLITEAL AUGMENT: NORMAL
BH CV LOWER VASCULAR LEFT POPLITEAL COMPETENT: NORMAL
BH CV LOWER VASCULAR LEFT POPLITEAL COMPRESS: NORMAL
BH CV LOWER VASCULAR LEFT POPLITEAL PHASIC: NORMAL
BH CV LOWER VASCULAR LEFT POPLITEAL SPONT: NORMAL
BH CV LOWER VASCULAR LEFT POSTERIOR TIBIAL COMPRESS: NORMAL
BH CV LOWER VASCULAR LEFT PROXIMAL FEMORAL COMPRESS: NORMAL
BH CV LOWER VASCULAR LEFT VARICOSITY BK COMPRESS: NORMAL
BH CV LOWER VASCULAR LEFT VARICOSITY BK THROMBUS: NORMAL
BH CV LOWER VASCULAR RIGHT COMMON FEMORAL AUGMENT: NORMAL
BH CV LOWER VASCULAR RIGHT COMMON FEMORAL COMPETENT: NORMAL
BH CV LOWER VASCULAR RIGHT COMMON FEMORAL COMPRESS: NORMAL
BH CV LOWER VASCULAR RIGHT COMMON FEMORAL PHASIC: NORMAL
BH CV LOWER VASCULAR RIGHT COMMON FEMORAL SPONT: NORMAL
BH CV VAS PRELIMINARY FINDINGS SCRIPTING: 1

## 2024-04-24 PROCEDURE — 93971 EXTREMITY STUDY: CPT

## 2024-04-24 PROCEDURE — 93971 EXTREMITY STUDY: CPT | Performed by: SURGERY

## 2024-04-24 PROCEDURE — 99284 EMERGENCY DEPT VISIT MOD MDM: CPT

## 2024-04-24 NOTE — ED PROVIDER NOTES
Time: 7:06 PM EDT  Date of encounter:  2024  Independent Historian/Clinical History and Information was obtained by:   Patient    History is limited by: N/A    Chief Complaint: Left calf pain      History of Present Illness:  Patient is a 58 y.o. year old female who presents to the emergency department for evaluation of left calf pain due to hematoma.  Sent here for urgent care to rule out DVT    HPI    Patient Care Team  Primary Care Provider: Julissa Wilkerson APRN    Past Medical History:     No Known Allergies  Past Medical History:   Diagnosis Date    Abnormal Pap smear of cervix     Allergic     Anemia     Arthritis     Asthma     Diverticulosis     GERD (gastroesophageal reflux disease)     High cholesterol     Hypertension     Low back pain     Migraine     Pneumonia     Scoliosis     Umbilical hernia     Uterine fibroid 2019     Past Surgical History:   Procedure Laterality Date    COLONOSCOPY  2019    COLONOSCOPY N/A 2023    Procedure: COLONOSCOPY;  Surgeon: Alan Cespedes MD;  Location: [facility] ENDOSCOPY;  Service: Gastroenterology;  Laterality: N/A;  DIVERTICULOSIS    ENDOSCOPY N/A 2023    Procedure: ESOPHAGOGASTRODUODENOSCOPY WITH BIOPSES;  Surgeon: Alan Cespedes MD;  Location: [facility] ENDOSCOPY;  Service: Gastroenterology;  Laterality: N/A;  HIATAL HERNIA    WISDOM TOOTH EXTRACTION       Family History   Problem Relation Age of Onset    Deep vein thrombosis Father         required filter, had been hospitalized    Arthritis Father     Diabetes Father     Hyperlipidemia Mother         Both parents    Hypertension Mother         Both parents    Stroke Mother     Vision loss Mother     Asthma Daughter     Miscarriages / Stillbirths Daughter             Breast cancer Neg Hx     Ovarian cancer Neg Hx     Uterine cancer Neg Hx     Colon cancer Neg Hx     Melanoma Neg Hx     Pulmonary embolism Neg Hx        Home Medications:  Prior to  Admission medications    Medication Sig Start Date End Date Taking? Authorizing Provider   Airsupra 90-80 MCG/ACT aerosol Please see attached for detailed directions 3/4/24   Leonidas Garcia MD   albuterol (PROVENTIL) (2.5 MG/3ML) 0.083% nebulizer solution USE 1 AMPULE EVERY 4-6 HOURS AS NEEDED FOR SHORTNESS OF AIR, COUGH, OR WHEEZE 1/23/24   Leonidas Garcia MD   diclofenac (VOLTAREN) 75 MG EC tablet take 1 tablet by mouth twice a day as directed 10/21/23   Leonidas Garcia MD   Diclofenac Sodium (VOLTAREN) 1 % gel gel Apply 4 g topically to the appropriate area as directed 4 (Four) Times a Day As Needed.    Leonidas Garcia MD   ezetimibe-simvastatin (VYTORIN) 10-40 MG per tablet TAKE 1 TABLET BY MOUTH EVERY DAY AT NIGHT 10/23/23   Julissa Wilkerson APRN   fluticasone (FLONASE) 50 MCG/ACT nasal spray fluticasone propionate 50 mcg/actuation nasal spray,suspension    Emergency, Nurse Nick RN   fluticasone-salmeterol (ADVAIR) 500-50 MCG/DOSE DISKUS Advair Diskus 500 mcg-50 mcg/dose powder for inhalation   INHALE 1 PUFF BY MOUTH TWICE A DAY    Emergency, Nurse Nick, RN   hydrOXYzine (ATARAX) 25 MG tablet hydroxyzine HCl 25 mg tablet    Emergency, Nurse Nick RN   ketotifen (ZADITOR) 0.025 % ophthalmic solution INSTILL 1 DROP INTO AFFECTED EYE(S) TWICE DAILY AS NEEDED FOR ITCHY EYES    Leonidas Garcia MD   levocetirizine (XYZAL) 5 MG tablet levocetirizine 5 mg tablet    Emergency, Nurse Nick, RN   linaclotide (Linzess) 145 MCG capsule capsule Take 1 capsule by mouth Every Morning Before Breakfast.    Leonidas Garcia MD   losartan-hydrochlorothiazide (HYZAAR) 100-12.5 MG per tablet TAKE 1 TABLET BY MOUTH EVERY DAY CONTACT PRIMARY CARE MD FOR REFILLS 10/23/23   Julissa Wilkerson APRN   Magnesium 100 MG capsule Take  by mouth.    Leonidas Garcia MD   methocarbamol (ROBAXIN) 500 MG tablet take 1 tablet by mouth twice a day as directed 3/1/24   Leonidas Garcia MD  "  montelukast (SINGULAIR) 10 MG tablet Take 1 tablet by mouth Daily. 10/26/23   Leonidas Garcia MD   Nurtec 75 MG dispersible tablet Take 1 tablet by mouth Every Other Day. 6/7/22   Leonidas Garcia MD   omeprazole (priLOSEC) 20 MG capsule TAKE 1 CAPSULE BY MOUTH EVERY DAY 3/11/24   Lyudmila Yao APRN   predniSONE (DELTASONE) 10 MG tablet Please see attached for detailed directions 1/22/24   Leonidas Garcia MD   Tiotropium Bromide Monohydrate (Spiriva Respimat) 1.25 MCG/ACT aerosol solution inhaler Spiriva Respimat 1.25 mcg/actuation solution for inhalation   INHALE 2 PUFFS BY MOUTH ONCE DAILY    Emergency, Nurse Nick RN   traMADol (ULTRAM) 50 MG tablet Every 12 (Twelve) Hours.    Emergency, Nurse Epic, RN   vitamin B-12 (CYANOCOBALAMIN) 100 MCG tablet Take 0.5 tablets by mouth Daily. 4/17/23   Julissa Wilkerson APRN   vitamin D (ERGOCALCIFEROL) 1.25 MG (13490 UT) capsule capsule Take 1 capsule by mouth 1 (One) Time Per Week. 4/17/23   Julissa Wilkerson APRN   albuterol sulfate  (90 Base) MCG/ACT inhaler albuterol sulfate HFA 90 mcg/actuation aerosol inhaler  4/24/24  Emergency, Nurse Epic, RN   brompheniramine-pseudoephedrine-DM 30-2-10 MG/5ML syrup Take 10 mL by mouth 4 (Four) Times a Day As Needed for Congestion or Cough. 12/19/23 4/24/24  Bibi Brewer PA-C        Social History:   Social History     Tobacco Use    Smoking status: Never    Smokeless tobacco: Never   Vaping Use    Vaping status: Never Used   Substance Use Topics    Alcohol use: Yes     Alcohol/week: 2.0 standard drinks of alcohol     Types: 2 Glasses of wine per week     Comment: occ    Drug use: Never         Review of Systems:  Review of Systems   Musculoskeletal:  Positive for myalgias.   Skin:  Positive for color change.        Physical Exam:  /86 (Patient Position: Sitting)   Pulse 71   Temp 98.6 °F (37 °C) (Oral)   Resp 18   Ht 175.3 cm (69\")   Wt 92.9 kg (204 lb 12.9 oz)   SpO2 " 100%   BMI 30.24 kg/m²     Physical Exam  Vitals and nursing note reviewed.   Cardiovascular:      Rate and Rhythm: Normal rate.   Pulmonary:      Effort: Pulmonary effort is normal.   Musculoskeletal:         General: Tenderness present. Normal range of motion.      Comments: Patient has a small area of firmness on the left lateral upper leg that is tender.   Skin:     General: Skin is warm and dry.   Neurological:      Mental Status: She is alert and oriented to person, place, and time.                  Procedures:  Procedures      Medical Decision Making:      Comorbidities that affect care:    Hypertension    External Notes reviewed:    Previous Clinic Note: Patient seen in urgent care earlier today and sent to the emergency department to obtain an ultrasound to rule out a DVT in the left lower extremity      The following orders were placed and all results were independently analyzed by me:  No orders of the defined types were placed in this encounter.      Medications Given in the Emergency Department:  Medications - No data to display     ED Course:    ED Course as of 04/24/24 2139 Wed Apr 24, 2024   1907 --- PROVIDER IN TRIAGE NOTE ---    The patient was evaluated by meAbeba in triage. Orders were placed and the patient is currently awaiting disposition.    [AJ]   1955 Negative for DVT. Positive for SVT left calf in the area of complaint.  [AJ]      ED Course User Index  [AJ] Abeba Nation, PAMartinezC       Labs:    Lab Results (last 24 hours)       ** No results found for the last 24 hours. **             Imaging:    Duplex Venous Lower Extremity LEFT    Result Date: 4/24/2024    Acute left lower extremity superficial thrombophlebitis noted in the varicosity (below knee).   All other left sided veins appeared normal.        Differential Diagnosis and Discussion:    Extremity Pain: Differential diagnosis includes but is not limited to soft tissue sprain, tendonitis, tendon injury,  dislocation, fracture, deep vein thrombosis, arterial insufficiency, osteoarthritis, bursitis, and ligamentous damage.    Ultrasound impression was interpreted by me.     MDM     The ultrasound of the patient's left lower extremity shows a superficial thrombophlebitis in the area where the patient is having tenderness and pain.  There is no evidence for DVT.  Patient is stable for discharge.      Patient Care Considerations:    NARCOTICS: I considered prescribing opiate pain medication as an outpatient, however patient is currently on NSAIDs and declined.      Consultants/Shared Management Plan:    None    Social Determinants of Health:    Patient is independent, reliable, and has access to care.       Disposition and Care Coordination:    Discharged: The patient is suitable and stable for discharge with no need for consideration of admission.    I have explained the patient´s condition, diagnoses and treatment plan based on the information available to me at this time. I have answered questions and addressed any concerns. The patient has a good  understanding of the patient´s diagnosis, condition, and treatment plan as can be expected at this point. The vital signs have been stable. The patient´s condition is stable and appropriate for discharge from the emergency department.      The patient will pursue further outpatient evaluation with the primary care physician or other designated or consulting physician as outlined in the discharge instructions. They are agreeable to this plan of care and follow-up instructions have been explained in detail. The patient has received these instructions in written format and has expressed an understanding of the discharge instructions. The patient is aware that any significant change in condition or worsening of symptoms should prompt an immediate return to this or the closest emergency department or call to 911.    Final diagnoses:   Thrombophlebitis of superficial veins of  left lower extremity        ED Disposition       ED Disposition   Discharge    Condition   Stable    Comment   --               This medical record created using voice recognition software.             Juan C Mae DO  04/24/24 2352

## 2024-04-25 NOTE — TELEPHONE ENCOUNTER
Caller: Mendoza Larissa KENNEY    Relationship: Self    Best call back number: [phone]     Requested Prescriptions:   Requested Prescriptions     Pending Prescriptions Disp Refills    ezetimibe-simvastatin (VYTORIN) 10-40 MG per tablet 30 tablet 0     Sig: Take 1 tablet by mouth every night at bedtime.        Pharmacy where request should be sent: [facility]/PHARMACY #[id number] - [place], [place] - [address] [facility] [phone] [facility] [phone] FX     Last office visit with prescribing clinician: 4/17/2023   Last telemedicine visit with prescribing clinician: Visit date not found   Next office visit with prescribing clinician: 5/24/2024     Additional details provided by patient: PATIENT STATES SHE NEEDS A COURTESY REFILL AND SHE IS OUT OF THE MEDICATION.    Does the patient have less than a 3 day supply:  [x] Yes  [] No    Elina Lehman Rep   04/25/24 13:13 EDT

## 2024-04-26 RX ORDER — EZETIMIBE AND SIMVASTATIN 10; 40 MG/1; MG/1
1 TABLET ORAL
Qty: 30 TABLET | Refills: 0 | Status: SHIPPED | OUTPATIENT
Start: 2024-04-26

## 2024-05-01 ENCOUNTER — OFFICE VISIT (OUTPATIENT)
Dept: FAMILY MEDICINE CLINIC | Facility: CLINIC | Age: 59
End: 2024-05-01
Payer: COMMERCIAL

## 2024-05-01 VITALS
TEMPERATURE: 97.8 F | OXYGEN SATURATION: 100 % | BODY MASS INDEX: 29.84 KG/M2 | HEART RATE: 55 BPM | WEIGHT: 201.5 LBS | SYSTOLIC BLOOD PRESSURE: 104 MMHG | HEIGHT: 69 IN | DIASTOLIC BLOOD PRESSURE: 64 MMHG

## 2024-05-01 DIAGNOSIS — R00.1 BRADYCARDIA: ICD-10-CM

## 2024-05-01 DIAGNOSIS — I10 PRIMARY HYPERTENSION: ICD-10-CM

## 2024-05-01 DIAGNOSIS — R73.03 PREDIABETES: ICD-10-CM

## 2024-05-01 DIAGNOSIS — I80.9 THROMBOPHLEBITIS: ICD-10-CM

## 2024-05-01 DIAGNOSIS — E78.2 MIXED HYPERLIPIDEMIA: ICD-10-CM

## 2024-05-01 DIAGNOSIS — Z00.00 ANNUAL PHYSICAL EXAM: Primary | ICD-10-CM

## 2024-05-01 PROCEDURE — 99396 PREV VISIT EST AGE 40-64: CPT

## 2024-05-01 PROCEDURE — 93000 ELECTROCARDIOGRAM COMPLETE: CPT

## 2024-05-01 PROCEDURE — 99214 OFFICE O/P EST MOD 30 MIN: CPT

## 2024-05-01 RX ORDER — EZETIMIBE AND SIMVASTATIN 10; 40 MG/1; MG/1
1 TABLET ORAL
Qty: 90 TABLET | Refills: 3 | Status: SHIPPED | OUTPATIENT
Start: 2024-05-01

## 2024-05-01 RX ORDER — ERGOCALCIFEROL 1.25 MG/1
50000 CAPSULE ORAL WEEKLY
Qty: 5 CAPSULE | Refills: 10 | Status: SHIPPED | OUTPATIENT
Start: 2024-05-01

## 2024-05-01 RX ORDER — FLUTICASONE PROPIONATE AND SALMETEROL 500; 50 UG/1; UG/1
1 POWDER RESPIRATORY (INHALATION) 2 TIMES DAILY
COMMUNITY

## 2024-05-01 RX ORDER — LOSARTAN POTASSIUM AND HYDROCHLOROTHIAZIDE 12.5; 1 MG/1; MG/1
1 TABLET ORAL DAILY
Qty: 90 TABLET | Refills: 3 | Status: SHIPPED | OUTPATIENT
Start: 2024-05-01

## 2024-05-01 NOTE — PROGRESS NOTES
Chief Complaint  Chief Complaint   Patient presents with    Hospital Follow Up Visit     Pt seen at urgent care and emergency room on 04/24/2024    Thrombophlebitis     Left lower extremity       Subjective      Larissa KENNEY Donaldson presents to [facility]  History of Present Illness  The patient presents today to follow up after ER visit where she was believed to have a DVT.    The patient continues to experience pain in her left lower extremity, despite adherence to a daily regimen of low-dose aspirin 81 mg since her ER visit. She reports no known injury to the area, which appeared abruptly without any preceding trauma. Initially, she suspected a spider bite, however, upon examination at an urgent care facility, it was determined that a spider bite was not present. The application of a heating pad resulted in discoloration, accompanied by mild itching. An ultrasound confirmed the absence of a clot, leading to the diagnosis of a hematoma. She has been utilizing compression stockings. She denies a personal history of varicose veins. 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    The patient requests a refill of her cholesterol medication.    The patient's heart rate was noted to be elevated during her ER visit. She reports experiencing mild chest discomfort, which she attributes to heavy lifting. Her heart rate today is below normal at 55.      Her father had a blood clot that traveled to his lung.      Objective     Medical History:  Past Medical History:   Diagnosis Date    Abnormal Pap smear of cervix     Allergic 2012    Anemia     Arthritis 2001    Asthma     Diverticulosis 2016    GERD (gastroesophageal reflux disease) 2021    High cholesterol     Hypertension     Low back pain 2009    Migraine     Pneumonia     Scoliosis     Umbilical hernia     Uterine fibroid 01/25/2019     Past Surgical History:   Procedure Laterality  Date    COLONOSCOPY  2019    COLONOSCOPY N/A 2023    Procedure: COLONOSCOPY;  Surgeon: Alan Cespedes MD;  Location: [facility] ENDOSCOPY;  Service: Gastroenterology;  Laterality: N/A;  DIVERTICULOSIS    ENDOSCOPY N/A 2023    Procedure: ESOPHAGOGASTRODUODENOSCOPY WITH BIOPSES;  Surgeon: Alan Cespedes MD;  Location: [facility] ENDOSCOPY;  Service: Gastroenterology;  Laterality: N/A;  HIATAL HERNIA    WISDOM TOOTH EXTRACTION        Social History     Tobacco Use    Smoking status: Never    Smokeless tobacco: Never   Vaping Use    Vaping status: Never Used   Substance Use Topics    Alcohol use: Yes     Alcohol/week: 2.0 standard drinks of alcohol     Types: 2 Glasses of wine per week     Comment: occ    Drug use: Never     Family History   Problem Relation Age of Onset    Deep vein thrombosis Father         required filter, had been hospitalized    Arthritis Father     Diabetes Father     Hyperlipidemia Mother         Both parents    Hypertension Mother         Both parents    Stroke Mother     Vision loss Mother     Asthma Daughter     Miscarriages / Stillbirths Daughter             Breast cancer Neg Hx     Ovarian cancer Neg Hx     Uterine cancer Neg Hx     Colon cancer Neg Hx     Melanoma Neg Hx     Pulmonary embolism Neg Hx        Medications:  Prior to Admission medications    Medication Sig Start Date End Date Taking? Authorizing Provider   Airsupra 90-80 MCG/ACT aerosol Please see attached for detailed directions 3/4/24  Yes ProviderLeonidas MD   albuterol (PROVENTIL) (2.5 MG/3ML) 0.083% nebulizer solution USE 1 AMPULE EVERY 4-6 HOURS AS NEEDED FOR SHORTNESS OF AIR, COUGH, OR WHEEZE 24  Yes Leonidas Garcia MD   diclofenac (VOLTAREN) 75 MG EC tablet take 1 tablet by mouth twice a day as directed 10/21/23  Yes Leonidas Garcia MD   Diclofenac Sodium (VOLTAREN) 1 % gel gel Apply 4 g topically to the appropriate area as directed 4 (Four) Times a Day As Needed.   Yes  ProviderLeonidas MD   ezetimibe-simvastatin (VYTORIN) 10-40 MG per tablet Take 1 tablet by mouth every night at bedtime. 4/26/24  Yes Julissa Wilkerson APRN   fluticasone (FLONASE) 50 MCG/ACT nasal spray fluticasone propionate 50 mcg/actuation nasal spray,suspension   Yes Emergency, Nurse Nick, RN   Fluticasone-Salmeterol (ADVAIR/WIXELA) 500-50 MCG/ACT DISKUS Inhale 1 puff 2 (Two) Times a Day.   Yes ProviderLeonidas MD   hydrOXYzine (ATARAX) 25 MG tablet hydroxyzine HCl 25 mg tablet   Yes Emergency, Nurse Nick, RN   ketotifen (ZADITOR) 0.025 % ophthalmic solution INSTILL 1 DROP INTO AFFECTED EYE(S) TWICE DAILY AS NEEDED FOR ITCHY EYES   Yes ProviderLeonidas MD   levocetirizine (XYZAL) 5 MG tablet levocetirizine 5 mg tablet   Yes Emergency, Nurse Nick, RN   linaclotide (Linzess) 145 MCG capsule capsule Take 1 capsule by mouth Every Morning Before Breakfast.   Yes ProviderLeonidas MD   losartan-hydrochlorothiazide (HYZAAR) 100-12.5 MG per tablet TAKE 1 TABLET BY MOUTH EVERY DAY CONTACT PRIMARY CARE MD FOR REFILLS 10/23/23  Yes Julissa Wilkerson APRN   Magnesium 100 MG capsule Take  by mouth.   Yes ProviderLeonidas MD   methocarbamol (ROBAXIN) 500 MG tablet take 1 tablet by mouth twice a day as directed 3/1/24  Yes Leonidas Garcia MD   montelukast (SINGULAIR) 10 MG tablet Take 1 tablet by mouth Daily. 10/26/23  Yes ProviderLeonidas MD   Nurtec 75 MG dispersible tablet Take 1 tablet by mouth Every Other Day. 6/7/22  Yes Leonidas Garcia MD   omeprazole (priLOSEC) 20 MG capsule TAKE 1 CAPSULE BY MOUTH EVERY DAY 3/11/24  Yes Lyudmila Yao APRN   predniSONE (DELTASONE) 10 MG tablet Please see attached for detailed directions 1/22/24  Yes Leonidas Garcia MD   Tiotropium Bromide Monohydrate (Spiriva Respimat) 1.25 MCG/ACT aerosol solution inhaler Spiriva Respimat 1.25 mcg/actuation solution for inhalation   INHALE 2 PUFFS BY MOUTH ONCE DAILY   Yes  "Emergency, Nurse Nick, RN   traMADol (ULTRAM) 50 MG tablet Every 12 (Twelve) Hours.   Yes Emergency, Nurse Nick, RN   vitamin B-12 (CYANOCOBALAMIN) 100 MCG tablet Take 0.5 tablets by mouth Daily. 4/17/23  Yes Julissa Wilkerson APRN   vitamin D (ERGOCALCIFEROL) 1.25 MG (69003 UT) capsule capsule Take 1 capsule by mouth 1 (One) Time Per Week. 4/17/23  Yes Julissa Wilkerson APRN   fluticasone-salmeterol (ADVAIR) 500-50 MCG/DOSE DISKUS Advair Diskus 500 mcg-50 mcg/dose powder for inhalation   INHALE 1 PUFF BY MOUTH TWICE A DAY  5/1/24 Yes Emergency, Nurse Nick, RN        Allergies:   Patient has no known allergies.    Health Maintenance Due   Topic Date Due    TDAP/TD VACCINES (2 - Tdap) 03/06/2007    ZOSTER VACCINE (1 of 2) Never done    ANNUAL PHYSICAL  Never done    COVID-19 Vaccine (4 - 2023-24 season) 09/01/2023    BMI FOLLOWUP  04/17/2024         Vital Signs:   /64 (BP Location: Left arm, Patient Position: Sitting, Cuff Size: Adult)   Pulse 55   Temp 97.8 °F (36.6 °C) (Oral)   Ht 175.3 cm (69\")   Wt 91.4 kg (201 lb 8 oz)   SpO2 100%   BMI 29.76 kg/m²     Wt Readings from Last 3 Encounters:   05/01/24 91.4 kg (201 lb 8 oz)   04/24/24 92.9 kg (204 lb 12.9 oz)   04/24/24 92.1 kg (203 lb)     BP Readings from Last 3 Encounters:   05/01/24 104/64   04/24/24 138/63   04/24/24 156/86       BMI is >= 25 and <30. (Overweight) The following options were offered after discussion;: exercise counseling/recommendations and nutrition counseling/recommendations       Physical Exam  Physical Exam        Result Review :    The following data was reviewed by BECCA Brewer on 05/01/24 at 16:32 EDT:    Common labs          7/13/2023    08:35 8/25/2023    14:34 9/11/2023    14:49   Common Labs   Creatinine   0.90    Albumin 4.7      Total Bilirubin 0.4      Alkaline Phosphatase 96      AST (SGOT) 23      ALT (SGPT) 24      Total Cholesterol  172     Triglycerides  35     HDL Cholesterol  87     LDL " Cholesterol   77     Hemoglobin A1C  6.10           No Images in the past 120 days found..    Results  Imaging  Doppler study revealed thrombophlebitis of superficial veins of her left lower extremity.        ECG 12 Lead    Date/Time: 5/1/2024 1:58 PM  Performed by: Julissa Wilkerson APRN    Authorized by: Julissa Wilkerson APRN  Comparison: compared with previous ECG from 3/8/2020  Similar to previous ECG  Rhythm: sinus rhythm  Rate: normal  Conduction: conduction normal  ST Segments: ST segments normal  T Waves: T waves normal  Other: no other findings    Clinical impression: normal ECG              Assessment and Plan    Diagnoses and all orders for this visit:    1. Annual physical exam (Primary)  -     CBC & Differential  -     Comprehensive Metabolic Panel  -     Lipid Panel  -     TSH Rfx On Abnormal To Free T4    2. Primary hypertension  -     CBC & Differential  -     Comprehensive Metabolic Panel  -     Lipid Panel  -     losartan-hydrochlorothiazide (HYZAAR) 100-12.5 MG per tablet; Take 1 tablet by mouth Daily.  Dispense: 90 tablet; Refill: 3    3. Mixed hyperlipidemia  -     CBC & Differential  -     Comprehensive Metabolic Panel  -     Lipid Panel  -     ezetimibe-simvastatin (VYTORIN) 10-40 MG per tablet; Take 1 tablet by mouth every night at bedtime.  Dispense: 90 tablet; Refill: 3    4. Prediabetes  -     Hemoglobin A1c    5. Bradycardia  -     ECG 12 Lead  -     TSH Rfx On Abnormal To Free T4    6. Thrombophlebitis  -     hydrocortisone 2.5 % cream; Apply 1 Application topically to the appropriate area as directed 2 (Two) Times a Day.  Dispense: 28 g; Refill: 0    Other orders  -     vitamin D (ERGOCALCIFEROL) 1.25 MG (27923 UT) capsule capsule; Take 1 capsule by mouth 1 (One) Time Per Week.  Dispense: 5 capsule; Refill: 10       Assessment & Plan  1.  Thrombophlebitis/hematoma in the left lower extremity.  The hematoma was initially erythematous, but has since decreased in size,  darkened, and has expanded. The duplex study performed in the ER revealed no deep vein thrombosis, left varicosity below the knee, acute superficial thrombophlebitis, and irritation of the vein. It is plausible that an insect bite or an injury could have precipitated this condition. The patient is advised to persist with the application of warm compresses or a heating pad to facilitate blood absorption of the hematoma. A topical cream will be prescribed as a precautionary measure against potential insect bites. Should the hematoma increase in size or cause discomfort, the patient is advised to return for a subsequent Doppler examination.    2. Hypercholesterolemia.  A refill of her cholesterol medication will be provided.    3. Hypertension.  The patient's blood pressure is well-regulated, however, her heart rate is slightly low. An EKG will be performed to rule out any abnormalities. Blood work, including cholesterol and A1c, will be conducted.    Patient is up-to-date on all age-appropriate screenings.  She declines any additional immunizations today.      Smoking Cessation:    Larissa Donaldson  reports that she has never smoked. She has never used smokeless tobacco.             Follow Up   Return if symptoms worsen or fail to improve, for Annual physical.  Patient was given instructions and counseling regarding her condition or for health maintenance advice. Please see specific information pulled into the AVS if appropriate.     Please note that portions of this note were completed with a voice recognition program.    Patient or patient representative verbalized consent for the use of Ambient Listening during the visit with  BECCA Brewer for chart documentation. 5/1/2024  16:32 EDT

## 2024-05-02 ENCOUNTER — CLINICAL SUPPORT (OUTPATIENT)
Dept: FAMILY MEDICINE CLINIC | Facility: CLINIC | Age: 59
End: 2024-05-02
Payer: COMMERCIAL

## 2024-05-02 DIAGNOSIS — I10 PRIMARY HYPERTENSION: Primary | ICD-10-CM

## 2024-05-02 LAB
ALBUMIN SERPL-MCNC: 4.5 G/DL (ref 3.5–5.2)
ALBUMIN/GLOB SERPL: 2 G/DL
ALP SERPL-CCNC: 86 U/L (ref 39–117)
ALT SERPL W P-5'-P-CCNC: 22 U/L (ref 1–33)
ANION GAP SERPL CALCULATED.3IONS-SCNC: 8.6 MMOL/L (ref 5–15)
AST SERPL-CCNC: 22 U/L (ref 1–32)
BASOPHILS # BLD AUTO: 0.08 10*3/MM3 (ref 0–0.2)
BASOPHILS NFR BLD AUTO: 1.2 % (ref 0–1.5)
BILIRUB SERPL-MCNC: 0.5 MG/DL (ref 0–1.2)
BUN SERPL-MCNC: 17 MG/DL (ref 6–20)
BUN/CREAT SERPL: 20.2 (ref 7–25)
CALCIUM SPEC-SCNC: 9.8 MG/DL (ref 8.6–10.5)
CHLORIDE SERPL-SCNC: 102 MMOL/L (ref 98–107)
CHOLEST SERPL-MCNC: 173 MG/DL (ref 0–200)
CO2 SERPL-SCNC: 29.4 MMOL/L (ref 22–29)
CREAT SERPL-MCNC: 0.84 MG/DL (ref 0.57–1)
DEPRECATED RDW RBC AUTO: 37.6 FL (ref 37–54)
EGFRCR SERPLBLD CKD-EPI 2021: 80.7 ML/MIN/1.73
EOSINOPHIL # BLD AUTO: 0.24 10*3/MM3 (ref 0–0.4)
EOSINOPHIL NFR BLD AUTO: 3.7 % (ref 0.3–6.2)
ERYTHROCYTE [DISTWIDTH] IN BLOOD BY AUTOMATED COUNT: 11.7 % (ref 12.3–15.4)
GLOBULIN UR ELPH-MCNC: 2.3 GM/DL
GLUCOSE SERPL-MCNC: 79 MG/DL (ref 65–99)
HBA1C MFR BLD: 5.8 % (ref 4.8–5.6)
HCT VFR BLD AUTO: 38.3 % (ref 34–46.6)
HDLC SERPL-MCNC: 87 MG/DL (ref 40–60)
HGB BLD-MCNC: 12.5 G/DL (ref 12–15.9)
IMM GRANULOCYTES # BLD AUTO: 0.01 10*3/MM3 (ref 0–0.05)
IMM GRANULOCYTES NFR BLD AUTO: 0.2 % (ref 0–0.5)
LDLC SERPL CALC-MCNC: 79 MG/DL (ref 0–100)
LDLC/HDLC SERPL: 0.93 {RATIO}
LYMPHOCYTES # BLD AUTO: 2.65 10*3/MM3 (ref 0.7–3.1)
LYMPHOCYTES NFR BLD AUTO: 41.1 % (ref 19.6–45.3)
MCH RBC QN AUTO: 29.1 PG (ref 26.6–33)
MCHC RBC AUTO-ENTMCNC: 32.6 G/DL (ref 31.5–35.7)
MCV RBC AUTO: 89.3 FL (ref 79–97)
MONOCYTES # BLD AUTO: 0.49 10*3/MM3 (ref 0.1–0.9)
MONOCYTES NFR BLD AUTO: 7.6 % (ref 5–12)
NEUTROPHILS NFR BLD AUTO: 2.97 10*3/MM3 (ref 1.7–7)
NEUTROPHILS NFR BLD AUTO: 46.2 % (ref 42.7–76)
NRBC BLD AUTO-RTO: 0 /100 WBC (ref 0–0.2)
PLATELET # BLD AUTO: 218 10*3/MM3 (ref 140–450)
PMV BLD AUTO: 10 FL (ref 6–12)
POTASSIUM SERPL-SCNC: 3.9 MMOL/L (ref 3.5–5.2)
PROT SERPL-MCNC: 6.8 G/DL (ref 6–8.5)
RBC # BLD AUTO: 4.29 10*6/MM3 (ref 3.77–5.28)
SODIUM SERPL-SCNC: 140 MMOL/L (ref 136–145)
TRIGL SERPL-MCNC: 26 MG/DL (ref 0–150)
TSH SERPL DL<=0.05 MIU/L-ACNC: 1.84 UIU/ML (ref 0.27–4.2)
VLDLC SERPL-MCNC: 7 MG/DL (ref 5–40)
WBC NRBC COR # BLD AUTO: 6.44 10*3/MM3 (ref 3.4–10.8)

## 2024-05-02 PROCEDURE — 36415 COLL VENOUS BLD VENIPUNCTURE: CPT

## 2024-05-02 PROCEDURE — 83036 HEMOGLOBIN GLYCOSYLATED A1C: CPT

## 2024-05-02 PROCEDURE — 80061 LIPID PANEL: CPT

## 2024-05-02 PROCEDURE — 80050 GENERAL HEALTH PANEL: CPT

## 2024-05-06 ENCOUNTER — OFFICE VISIT (OUTPATIENT)
Dept: GASTROENTEROLOGY | Facility: CLINIC | Age: 59
End: 2024-05-06
Payer: COMMERCIAL

## 2024-05-06 VITALS
DIASTOLIC BLOOD PRESSURE: 73 MMHG | WEIGHT: 200.6 LBS | HEART RATE: 57 BPM | HEIGHT: 69 IN | SYSTOLIC BLOOD PRESSURE: 135 MMHG | BODY MASS INDEX: 29.71 KG/M2

## 2024-05-06 DIAGNOSIS — K76.0 FATTY LIVER: ICD-10-CM

## 2024-05-06 DIAGNOSIS — E66.3 OVERWEIGHT (BMI 25.0-29.9): ICD-10-CM

## 2024-05-06 DIAGNOSIS — R10.13 EPIGASTRIC PAIN: ICD-10-CM

## 2024-05-06 DIAGNOSIS — K59.04 CHRONIC IDIOPATHIC CONSTIPATION: Primary | ICD-10-CM

## 2024-05-06 RX ORDER — OMEPRAZOLE 40 MG/1
40 CAPSULE, DELAYED RELEASE ORAL DAILY
Qty: 90 CAPSULE | Refills: 1 | Status: SHIPPED | OUTPATIENT
Start: 2024-05-06

## 2024-05-06 RX ORDER — ASPIRIN 81 MG/1
81 TABLET ORAL DAILY
COMMUNITY

## 2024-05-24 ENCOUNTER — OFFICE VISIT (OUTPATIENT)
Dept: FAMILY MEDICINE CLINIC | Facility: CLINIC | Age: 59
End: 2024-05-24
Payer: COMMERCIAL

## 2024-05-24 VITALS
WEIGHT: 203.7 LBS | HEIGHT: 69 IN | OXYGEN SATURATION: 98 % | HEART RATE: 63 BPM | TEMPERATURE: 97.5 F | DIASTOLIC BLOOD PRESSURE: 80 MMHG | SYSTOLIC BLOOD PRESSURE: 130 MMHG | BODY MASS INDEX: 30.17 KG/M2

## 2024-05-24 DIAGNOSIS — I80.9 THROMBOPHLEBITIS: Primary | ICD-10-CM

## 2024-05-24 DIAGNOSIS — M15.9 PRIMARY OSTEOARTHRITIS INVOLVING MULTIPLE JOINTS: ICD-10-CM

## 2024-05-24 PROCEDURE — 99213 OFFICE O/P EST LOW 20 MIN: CPT

## 2024-05-24 NOTE — PROGRESS NOTES
Chief Complaint  Chief Complaint   Patient presents with    Follow-up       Subjective      Larissanae Donaldson presents to [facility]  History of Present Illness  The patient presents for evaluation of thrombophlebitis of her left lower extremity.    Patient was last seen on 5/1/2024 following an ER visit where she thought she had a DVT but ultrasound revealed absence of DVT or SVT but presence of thrombophlebitis.  Previous area of discoloration has resolved and patient denies any pain to the area.    The patient reports an improvement in her leg condition. She describes experiencing widespread soreness, which prevented her from working the previous night. She has been managing her pain with Tylenol Arthritis, which she reports as being effective. She does not require any medication refills at this time.      Objective     Medical History:  Past Medical History:   Diagnosis Date    Abnormal Pap smear of cervix     Allergic 2012    Anemia     Arthritis 2001    Asthma     Diverticulosis 2016    GERD (gastroesophageal reflux disease) 2021    High cholesterol     Hypertension     Low back pain 2009    Migraine     Pneumonia     Scoliosis     Umbilical hernia     Uterine fibroid 01/25/2019     Past Surgical History:   Procedure Laterality Date    COLONOSCOPY  03/18/2019    COLONOSCOPY N/A 5/22/2023    Procedure: COLONOSCOPY;  Surgeon: Alan Cespedes MD;  Location: [facility] ENDOSCOPY;  Service: Gastroenterology;  Laterality: N/A;  DIVERTICULOSIS    ENDOSCOPY N/A 5/22/2023    Procedure: ESOPHAGOGASTRODUODENOSCOPY WITH BIOPSES;  Surgeon: Alan Cespedes MD;  Location: [facility] ENDOSCOPY;  Service: Gastroenterology;  Laterality: N/A;  HIATAL HERNIA    WISDOM TOOTH EXTRACTION        Social History     Tobacco Use    Smoking status: Never    Smokeless tobacco: Never   Vaping Use    Vaping status: Never Used   Substance Use Topics    Alcohol use: Yes     Alcohol/week: 2.0 standard drinks  of alcohol     Types: 2 Glasses of wine per week     Comment: occ    Drug use: Never     Family History   Problem Relation Age of Onset    Deep vein thrombosis Father         required filter, had been hospitalized    Arthritis Father     Diabetes Father     Hyperlipidemia Mother         Both parents    Hypertension Mother         Both parents    Stroke Mother     Vision loss Mother     Asthma Daughter     Miscarriages / Stillbirths Daughter             Breast cancer Neg Hx     Ovarian cancer Neg Hx     Uterine cancer Neg Hx     Colon cancer Neg Hx     Melanoma Neg Hx     Pulmonary embolism Neg Hx        Medications:  Prior to Admission medications    Medication Sig Start Date End Date Taking? Authorizing Provider   Airsupra 90-80 MCG/ACT aerosol Please see attached for detailed directions 3/4/24  Yes Leonidas Garcia MD   albuterol (PROVENTIL) (2.5 MG/3ML) 0.083% nebulizer solution USE 1 AMPULE EVERY 4-6 HOURS AS NEEDED FOR SHORTNESS OF AIR, COUGH, OR WHEEZE 24  Yes Leonidas Garcia MD   aspirin 81 MG EC tablet Take 1 tablet by mouth Daily.   Yes Leonidas Garcia MD   diclofenac (VOLTAREN) 75 MG EC tablet take 1 tablet by mouth twice a day as directed 10/21/23  Yes Leonidas Garcia MD   Diclofenac Sodium (VOLTAREN) 1 % gel gel Apply 4 g topically to the appropriate area as directed 4 (Four) Times a Day As Needed.   Yes ProviderLeonidas MD   ezetimibe-simvastatin (VYTORIN) 10-40 MG per tablet Take 1 tablet by mouth every night at bedtime. 24  Yes Julissa Wilkerson APRN   fluticasone (FLONASE) 50 MCG/ACT nasal spray fluticasone propionate 50 mcg/actuation nasal spray,suspension   Yes Emergency, Nurse Epic, RN   Fluticasone-Salmeterol (ADVAIR/WIXELA) 500-50 MCG/ACT DISKUS Inhale 1 puff 2 (Two) Times a Day.   Yes ProviderLeonidas MD   hydrocortisone 2.5 % cream Apply 1 Application topically to the appropriate area as directed 2 (Two) Times a Day. 24  Yes Rock  BECCA Osborne   hydrOXYzine (ATARAX) 25 MG tablet hydroxyzine HCl 25 mg tablet   Yes Emergency, Nurse Nick RN   ketotifen (ZADITOR) 0.025 % ophthalmic solution INSTILL 1 DROP INTO AFFECTED EYE(S) TWICE DAILY AS NEEDED FOR ITCHY EYES   Yes Provider, MD Leonidas   levocetirizine (XYZAL) 5 MG tablet levocetirizine 5 mg tablet   Yes Emergency, Nurse NEHA Romero   linaclotide (Linzess) 145 MCG capsule capsule Take 1 capsule by mouth Every Morning Before Breakfast.   Yes Provider, MD Leonidas   losartan-hydrochlorothiazide (HYZAAR) 100-12.5 MG per tablet Take 1 tablet by mouth Daily. 5/1/24  Yes Julisas Wilkerson APRN   Magnesium 100 MG capsule Take  by mouth.   Yes Provider, MD Leonidas   methocarbamol (ROBAXIN) 500 MG tablet take 1 tablet by mouth twice a day as directed 3/1/24  Yes Provider, MD Leonidas   montelukast (SINGULAIR) 10 MG tablet Take 1 tablet by mouth Daily. 10/26/23  Yes Provider, MD Leonidas   Nurtec 75 MG dispersible tablet Take 1 tablet by mouth Every Other Day. 6/7/22  Yes Provider, MD Leonidas   omeprazole (priLOSEC) 40 MG capsule Take 1 capsule by mouth Daily. 5/6/24  Yes Lyudmila Yao APRN   predniSONE (DELTASONE) 10 MG tablet Please see attached for detailed directions 1/22/24  Yes Provider, MD Leonidas   Tiotropium Bromide Monohydrate (Spiriva Respimat) 1.25 MCG/ACT aerosol solution inhaler Spiriva Respimat 1.25 mcg/actuation solution for inhalation   INHALE 2 PUFFS BY MOUTH ONCE DAILY   Yes Emergency, Nurse Nick RN   traMADol (ULTRAM) 50 MG tablet Every 12 (Twelve) Hours.   Yes Emergency, Nurse Epic, RN   vitamin B-12 (CYANOCOBALAMIN) 100 MCG tablet Take 0.5 tablets by mouth Daily. 4/17/23  Yes Julissa Wilkerson APRN   vitamin D (ERGOCALCIFEROL) 1.25 MG (55038 UT) capsule capsule Take 1 capsule by mouth 1 (One) Time Per Week. 5/1/24  Yes Julissa Wilkerson APRN        Allergies:   Patient has no known allergies.    Health Maintenance Due  "  Topic Date Due    TDAP/TD VACCINES (2 - Tdap) 03/06/2007    ZOSTER VACCINE (1 of 2) Never done    COVID-19 Vaccine (4 - 2023-24 season) 09/01/2023         Vital Signs:   /80 (BP Location: Left arm, Patient Position: Sitting, Cuff Size: Adult)   Pulse 63   Temp 97.5 °F (36.4 °C) (Temporal)   Ht 175.3 cm (69.02\")   Wt 92.4 kg (203 lb 11.2 oz)   SpO2 98%   BMI 30.07 kg/m²     Wt Readings from Last 3 Encounters:   05/24/24 92.4 kg (203 lb 11.2 oz)   05/06/24 91 kg (200 lb 9.6 oz)   05/01/24 91.4 kg (201 lb 8 oz)     BP Readings from Last 3 Encounters:   05/24/24 130/80   05/06/24 135/73   05/01/24 104/64         Physical Exam  Vitals reviewed.   Constitutional:       Appearance: Normal appearance. She is well-developed.   HENT:      Head: Normocephalic and atraumatic.   Eyes:      Conjunctiva/sclera: Conjunctivae normal.      Pupils: Pupils are equal, round, and reactive to light.   Cardiovascular:      Rate and Rhythm: Normal rate and regular rhythm.   Pulmonary:      Effort: Pulmonary effort is normal.      Breath sounds: Normal breath sounds.   Skin:     General: Skin is warm and dry.   Neurological:      Mental Status: She is alert and oriented to person, place, and time.   Psychiatric:         Mood and Affect: Mood and affect normal.         Behavior: Behavior normal.         Thought Content: Thought content normal.         Judgment: Judgment normal.       Physical Exam        Result Review :    The following data was reviewed by BECCA Brewer on 05/24/24 at 15:04 EDT:        No Images in the past 120 days found..    Results                 Assessment and Plan    Diagnoses and all orders for this visit:    1. Thrombophlebitis (Primary)    2. Primary osteoarthritis involving multiple joints       Assessment & Plan    The patient's condition has shown significant improvement.  Patient encouraged to continue regular use of compression stockings to prevent any clots, varicosities, edema or " related pain.  Continue to monitor the area of concern and if it worsens, we can repeat an ultrasound of the lower extremity.    Regarding osteoarthritis patient was encouraged to continue following up with pain management as scheduled.  Additionally we discussed potential benefit of Tylenol arthritis the patient states that she is currently prescribed prescription pain medication and prefers to continue taking her routine medications.          Smoking Cessation:    Larissa Donaldson  reports that she has never smoked. She has never used smokeless tobacco.             Follow Up   Return if symptoms worsen or fail to improve.  Patient was given instructions and counseling regarding her condition or for health maintenance advice. Please see specific information pulled into the AVS if appropriate.     Please note that portions of this note were completed with a voice recognition program.    Patient or patient representative verbalized consent for the use of Ambient Listening during the visit with  BECCA Brewer for chart documentation. 5/24/2024  15:04 EDT

## 2024-06-10 ENCOUNTER — HOSPITAL ENCOUNTER (OUTPATIENT)
Dept: ULTRASOUND IMAGING | Facility: HOSPITAL | Age: 59
Discharge: HOME | End: 2024-06-10
Admitting: NURSE PRACTITIONER
Payer: COMMERCIAL

## 2024-06-10 DIAGNOSIS — K76.0 FATTY LIVER: ICD-10-CM

## 2024-06-10 PROCEDURE — 76705 ECHO EXAM OF ABDOMEN: CPT

## 2024-07-11 RX ORDER — UBIDECARENONE 75 MG
50 CAPSULE ORAL DAILY
Qty: 45 TABLET | Refills: 3 | Status: SHIPPED | OUTPATIENT
Start: 2024-07-11

## 2024-08-05 ENCOUNTER — OFFICE VISIT (OUTPATIENT)
Dept: GASTROENTEROLOGY | Facility: CLINIC | Age: 59
End: 2024-08-05
Payer: COMMERCIAL

## 2024-08-05 VITALS
HEIGHT: 69 IN | DIASTOLIC BLOOD PRESSURE: 76 MMHG | HEART RATE: 57 BPM | SYSTOLIC BLOOD PRESSURE: 130 MMHG | WEIGHT: 206.8 LBS | BODY MASS INDEX: 30.63 KG/M2

## 2024-08-05 DIAGNOSIS — K59.04 CHRONIC IDIOPATHIC CONSTIPATION: ICD-10-CM

## 2024-08-05 DIAGNOSIS — K76.89 LIVER CYST: Primary | ICD-10-CM

## 2024-08-05 DIAGNOSIS — K42.9 UMBILICAL HERNIA WITHOUT OBSTRUCTION AND WITHOUT GANGRENE: ICD-10-CM

## 2024-08-05 DIAGNOSIS — R10.13 EPIGASTRIC PAIN: ICD-10-CM

## 2024-08-05 NOTE — PROGRESS NOTES
Patient Name: Larissa Donaldson   Visit Date: 08/05/2024   Patient ID: [id number]  Provider: BECCA Ward    Sex: female  Location:  Location Address:  Location Phone: [address] [place] [address] [phone]    YOB: 1965  Age: 58 y.o.   Primary Care Provider Julissa Wilkerson APRN      Referring Provider: No ref. provider found        Chief Complaint  Fatty Liver (Follow up ) and Constipation (Pt states having a BM every other day, having hard stool, occ straining with taking Linzess 145mcg daily )    History of Present Illness    Patient initially presented 3/6/2023 with abdominal pain worsening over 1 year.  Omeprazole did not help initially, was taking diclofenac and trying to reduce use.  Reported losing 20 pounds due to abdominal pain and avoiding eating.  Also with complaint of constipation was given Linzess and it caused diarrhea so was taking as needed.  BRB seen with straining. Patient was advised to stop NSAIDs, omeprazole increased to 40 mg/day     EGD colonoscopy 5/22/2023: Medium size hiatal hernia, normal esophagus, normal stomach-biopsy negative and normal duodenum good bowel prep, normal colonoscopy, diverticula found in the sigmoid     Gallbladder ultrasound 6/19/2023: Numerous cysts noted within the liver-simple, liver increased in echogenicity compatible with diffuse hepatocellular disease/fatty liver. Gallbladder is unremarkable    Acute hepatitis panel 7/13/2023 negative  7/13/2023 liver enzymes are normal, alk phos normal, bilirubin 0.4, albumin normal at 4.7, INR normal at 0.95     CT of the abdomen without contrast 9/11/2023: Numerous hepatic cyst, fat-containing umbilical hernia-surgeon referral offered, patient wanted to wait until follow-up appointment    Patient was last seen 5/6/2024, still with some constipation, small BM daily - Added Colace to Linzess.  patient reported requiring diclofenac due to joint pains, omeprazole was increased to 40  mg/day due to abdominal pain, I recommended to repeat EGD also but patient declined.    Liver ultrasound 6/10/2024: Liver appears normal in echogenicity, there are numerous hepatic cysts throughout measuring up to 5.7 cm in size some with thin internal septations.  I reviewed with Dr. Cespedes and he recommended following every 6 months due to large liver cyst  Last LFTs 5/1/2024: Normal    Pt states she has increased prunes in her diet and constipation is improved. She did not try Colace. Pt states abd pain is improved with Omeprazole 40 mg/d, still requiring NSAIDs.   Pt states she is trying not to lift. Occasional discomfort with umbilical hernia, states has been too hot to wear a binder. Still does not want to see surgeon to discuss at this time.   Past Medical History:   Diagnosis Date    Abnormal Pap smear of cervix     Allergic 2012    Anemia     Arthritis 2001    Asthma     Diverticulosis 2016    GERD (gastroesophageal reflux disease) 2021    High cholesterol     Hypertension     Low back pain 2009    Migraine     Pneumonia     Scoliosis     Umbilical hernia     Uterine fibroid 01/25/2019       Past Surgical History:   Procedure Laterality Date    COLONOSCOPY  03/18/2019    COLONOSCOPY N/A 05/22/2023    Procedure: COLONOSCOPY;  Surgeon: Alan Cespedes MD;  Location: [facility] ENDOSCOPY;  Service: Gastroenterology;  Laterality: N/A;  DIVERTICULOSIS    ENDOSCOPY N/A 05/22/2023    Procedure: ESOPHAGOGASTRODUODENOSCOPY WITH BIOPSES;  Surgeon: Alan Cespedes MD;  Location: [facility] ENDOSCOPY;  Service: Gastroenterology;  Laterality: N/A;  HIATAL HERNIA    WISDOM TOOTH EXTRACTION         No Known Allergies    Family History   Problem Relation Age of Onset    Deep vein thrombosis Father         required filter, had been hospitalized    Arthritis Father     Diabetes Father     Hyperlipidemia Mother         Both parents    Hypertension Mother         Both parents    Stroke Mother     Vision loss Mother      "Asthma Daughter     Miscarriages / Stillbirths Daughter             Breast cancer Neg Hx     Ovarian cancer Neg Hx     Uterine cancer Neg Hx     Colon cancer Neg Hx     Melanoma Neg Hx     Pulmonary embolism Neg Hx         Social History     Tobacco Use    Smoking status: Never    Smokeless tobacco: Never   Vaping Use    Vaping status: Never Used   Substance Use Topics    Alcohol use: Yes     Alcohol/week: 2.0 standard drinks of alcohol     Types: 2 Glasses of wine per week     Comment: Red or a special occasions    Drug use: Never       Objective     Vital Signs:   /76 (BP Location: Left arm, Patient Position: Sitting, Cuff Size: Adult)   Pulse 57   Ht 175.3 cm (69.02\")   Wt 93.8 kg (206 lb 12.8 oz)   BMI 30.52 kg/m²       Physical Exam  Constitutional:       General: The patient is not in acute distress.     Appearance: Normal appearance.   HENT:      Head: Normocephalic and atraumatic.      Nose: Nose normal.   Pulmonary:      Effort: Pulmonary effort is normal. No respiratory distress.   Abdominal:      General: Abdomen is flat.      Palpations: Abdomen is soft. There is no mass.      Tenderness: There is no abdominal tenderness. There is no guarding.   Musculoskeletal:      Cervical back: Neck supple.      Right lower leg: No edema.      Left lower leg: No edema.   Skin:     General: Skin is warm and dry.   Neurological:      General: No focal deficit present.      Mental Status: The patient is alert and oriented to person, place, and time.      Gait: Gait normal.   Psychiatric:         Mood and Affect: Mood normal.         Speech: Speech normal.         Behavior: Behavior normal.         Thought Content: Thought content normal.     Result Review :   The following data was reviewed by: BECCA Ward on 2024:    CBC w/diff          2024    09:36   CBC w/Diff   WBC 6.44    RBC 4.29    Hemoglobin 12.5    Hematocrit 38.3    MCV 89.3    MCH 29.1    MCHC 32.6    RDW 11.7  " "  Platelets 218    Neutrophil Rel % 46.2    Immature Granulocyte Rel % 0.2    Lymphocyte Rel % 41.1    Monocyte Rel % 7.6    Eosinophil Rel % 3.7    Basophil Rel % 1.2      CMP          9/11/2023    14:49 5/2/2024    09:36   CMP   Glucose  79    BUN  17    Creatinine 0.90  0.84    EGFR 74.7  80.7    Sodium  140    Potassium  3.9    Chloride  102    Calcium  9.8    Total Protein  6.8    Albumin  4.5    Globulin  2.3    Total Bilirubin  0.5    Alkaline Phosphatase  86    AST (SGOT)  22    ALT (SGPT)  22    Albumin/Globulin Ratio  2.0    BUN/Creatinine Ratio  20.2    Anion Gap  8.6        AFP No results found for: \"AFP\"   PT/INR   Protime   Date Value Ref Range Status   07/13/2023 12.8 11.8 - 14.9 Seconds Final     INR   Date Value Ref Range Status   07/13/2023 0.95 0.86 - 1.15 Final               Assessment and Plan    Diagnoses and all orders for this visit:    1. Liver cyst (Primary)  -     US Liver; Future  -     Cancel: Protime-INR; Future  -     Cancel: Hepatic Function Panel; Future  -     Hepatic Function Panel; Future  -     Protime-INR; Future    2. Chronic idiopathic constipation    3. Epigastric pain    4. Umbilical hernia without obstruction and without gangrene              Follow Up   Return in about 6 months (around 2/5/2025).  Liver US Dec  Labs in Dec   Recommended patient have an EGD for epigastric pain but she does not do at this time, she continues to have difficulty getting away from NSAIDs, encouraged her to take with food, continue PPI, if any symptoms worsen she will call  Continue Linzess, may add Colace if needed or continue frequent prunes if this is working well enough.      Patient was given instructions and counseling regarding her condition or for health maintenance advice. Please see specific information pulled into the AVS if appropriate.     "

## 2024-08-20 ENCOUNTER — OFFICE VISIT (OUTPATIENT)
Dept: FAMILY MEDICINE CLINIC | Facility: CLINIC | Age: 59
End: 2024-08-20
Payer: COMMERCIAL

## 2024-08-20 VITALS
TEMPERATURE: 97.9 F | DIASTOLIC BLOOD PRESSURE: 70 MMHG | SYSTOLIC BLOOD PRESSURE: 122 MMHG | HEIGHT: 69 IN | HEART RATE: 61 BPM | BODY MASS INDEX: 30.01 KG/M2 | OXYGEN SATURATION: 100 % | WEIGHT: 202.6 LBS

## 2024-08-20 DIAGNOSIS — R10.9 LEFT FLANK PAIN: Primary | ICD-10-CM

## 2024-08-20 DIAGNOSIS — A08.4 VIRAL GASTROENTERITIS: ICD-10-CM

## 2024-08-20 DIAGNOSIS — G43.E09 CHRONIC MIGRAINE WITH AURA WITHOUT STATUS MIGRAINOSUS, NOT INTRACTABLE: ICD-10-CM

## 2024-08-20 PROCEDURE — 99214 OFFICE O/P EST MOD 30 MIN: CPT

## 2024-08-20 RX ORDER — ONDANSETRON 4 MG/1
4 TABLET, ORALLY DISINTEGRATING ORAL EVERY 8 HOURS PRN
Qty: 16 TABLET | Refills: 0 | Status: SHIPPED | OUTPATIENT
Start: 2024-08-20

## 2024-08-20 NOTE — PROGRESS NOTES
Chief Complaint  Chief Complaint   Patient presents with    Migraine     Pt seen at  on 08/13/2024 for migraine, allergic rhinitis, and nausea    Abdominal Pain     Pt has pain all the time due to 2 hernias, but has felt a little more pain than normal on the left side x 1 week       Subjective      Larissa Donaldson presents to [facility]  History of Present Illness  The patient presents for evaluation of multiple medical concerns.    She sought urgent care due to a migraine, having exhausted her medication supply. She was administered an injection on Tuesday, which unfortunately did not alleviate her symptoms. She takes Nurtec every other day for her headaches, a medication prescribed by her allergist. She recently received a refill via mail on Wednesday. She was advised against taking Excedrin Migraine.    She has been experiencing nausea for some time, which was addressed with anti-nausea medication. However, she began to vomit and experience diarrhea on Wednesday, symptoms that persisted for over 6 hours. Initially, she felt pain on both sides, but it has since localized to one side. She experiences constant abdominal pain, particularly after eating. This symptom had previously subsided but returned after her episode on Wednesday. While she is no longer vomiting, she feels nauseous after eating. Her diarrhea has resolved, and she had a bowel movement yesterday. She has been able to eat more over the weekend. She noticed blood in her vomit, which she attributes to the frequency of her vomiting. She also experienced a burning sensation during vomiting. She underwent a scope procedure a year ago and was offered surgical consultation for her hernias, which she declined. She reports no painful urination, but notes that her urine is no longer clear. She attempted to alleviate her symptoms with a muscle relaxer, but it was ineffective.        Objective     Medical History:  Past  Medical History:   Diagnosis Date    Abnormal Pap smear of cervix     Allergic 2012    Anemia     Arthritis 2001    Asthma     Diverticulosis 2016    GERD (gastroesophageal reflux disease)     High cholesterol     Hypertension     Low back pain 2009    Migraine     Pneumonia     Scoliosis     Umbilical hernia     Uterine fibroid 2019     Past Surgical History:   Procedure Laterality Date    COLONOSCOPY  2019    COLONOSCOPY N/A 2023    Procedure: COLONOSCOPY;  Surgeon: Alan Cespedes MD;  Location: [facility] ENDOSCOPY;  Service: Gastroenterology;  Laterality: N/A;  DIVERTICULOSIS    ENDOSCOPY N/A 2023    Procedure: ESOPHAGOGASTRODUODENOSCOPY WITH BIOPSES;  Surgeon: Alan Cespedes MD;  Location: [facility] ENDOSCOPY;  Service: Gastroenterology;  Laterality: N/A;  HIATAL HERNIA    WISDOM TOOTH EXTRACTION        Social History     Tobacco Use    Smoking status: Never    Smokeless tobacco: Never   Vaping Use    Vaping status: Never Used   Substance Use Topics    Alcohol use: Yes     Alcohol/week: 2.0 standard drinks of alcohol     Types: 2 Glasses of wine per week     Comment: Red or a special occasions    Drug use: Never     Family History   Problem Relation Age of Onset    Deep vein thrombosis Father         required filter, had been hospitalized    Arthritis Father     Diabetes Father     Hyperlipidemia Mother         Both parents    Hypertension Mother         Both parents    Stroke Mother     Vision loss Mother     Diabetes Mother     Kidney disease Mother     Asthma Daughter     Miscarriages / Stillbirths Daughter             Breast cancer Neg Hx     Ovarian cancer Neg Hx     Uterine cancer Neg Hx     Colon cancer Neg Hx     Melanoma Neg Hx     Pulmonary embolism Neg Hx        Medications:  Prior to Admission medications    Medication Sig Start Date End Date Taking? Authorizing Provider   Airsupra 90-80 MCG/ACT aerosol Please see attached for detailed directions 3/4/24  Yes  ProviderLeonidas MD   albuterol (PROVENTIL) (2.5 MG/3ML) 0.083% nebulizer solution USE 1 AMPULE EVERY 4-6 HOURS AS NEEDED FOR SHORTNESS OF AIR, COUGH, OR WHEEZE 1/23/24  Yes Leonidas Garcia MD   aspirin 81 MG EC tablet Take 1 tablet by mouth Daily.   Yes Leonidas Garcia MD   diclofenac (VOLTAREN) 75 MG EC tablet take 1 tablet by mouth twice a day as directed 10/21/23  Yes Leonidas Garcia MD   Diclofenac Sodium (VOLTAREN) 1 % gel gel Apply 4 g topically to the appropriate area as directed 4 (Four) Times a Day As Needed.   Yes Leonidas Garcia MD   ezetimibe-simvastatin (VYTORIN) 10-40 MG per tablet Take 1 tablet by mouth every night at bedtime. 5/1/24  Yes Julissa Wilkerson APRN   fluticasone (FLONASE) 50 MCG/ACT nasal spray fluticasone propionate 50 mcg/actuation nasal spray,suspension   Yes Emergency, Nurse Nick, RN   Fluticasone-Salmeterol (ADVAIR/WIXELA) 500-50 MCG/ACT DISKUS Inhale 1 puff 2 (Two) Times a Day.   Yes Leonidas Garcia MD   hydrOXYzine (ATARAX) 25 MG tablet hydroxyzine HCl 25 mg tablet   Yes Emergency, Nurse Nick, RN   levocetirizine (XYZAL) 5 MG tablet levocetirizine 5 mg tablet   Yes Emergency, Nurse Epic, RN   linaclotide (Linzess) 145 MCG capsule capsule Take 1 capsule by mouth Every Morning Before Breakfast.   Yes Leonidas Garcia MD   losartan-hydrochlorothiazide (HYZAAR) 100-12.5 MG per tablet Take 1 tablet by mouth Daily. 5/1/24  Yes Julissa Wilkerson APRN   Magnesium 100 MG capsule Take  by mouth.   Yes Leonidas Garcia MD   methocarbamol (ROBAXIN) 500 MG tablet take 1 tablet by mouth twice a day as directed 3/1/24  Yes Leonidas Garcia MD   montelukast (SINGULAIR) 10 MG tablet Take 1 tablet by mouth Daily. 10/26/23  Yes Leonidas Garcia MD   Nurtec 75 MG dispersible tablet Take 1 tablet by mouth Every Other Day. 6/7/22  Yes Leonidas Garcia MD   omeprazole (priLOSEC) 40 MG capsule Take 1 capsule by mouth Daily. 5/6/24   "Yes Lyudmila Yao APRN   Tiotropium Bromide Monohydrate (Spiriva Respimat) 1.25 MCG/ACT aerosol solution inhaler Spiriva Respimat 1.25 mcg/actuation solution for inhalation   INHALE 2 PUFFS BY MOUTH ONCE DAILY   Yes Emergency, Nurse Nick RN   traMADol (ULTRAM) 50 MG tablet Every 12 (Twelve) Hours.   Yes Emergency, Nurse Epic, RN   vitamin B-12 (CYANOCOBALAMIN) 100 MCG tablet TAKE 1/2 TABLET BY MOUTH EVERY DAY 7/11/24  Yes Julissa Wilkerson APRN   vitamin D (ERGOCALCIFEROL) 1.25 MG (33582 UT) capsule capsule Take 1 capsule by mouth 1 (One) Time Per Week. 5/1/24  Yes Julisas Wilkerson APRN        Allergies:   Patient has no known allergies.    Health Maintenance Due   Topic Date Due    TDAP/TD VACCINES (2 - Tdap) 03/06/2007    ZOSTER VACCINE (1 of 2) Never done    COVID-19 Vaccine (4 - 2023-24 season) 09/01/2023    INFLUENZA VACCINE  08/01/2024         Vital Signs:   /70 (BP Location: Left arm, Patient Position: Sitting, Cuff Size: Adult)   Pulse 61   Temp 97.9 °F (36.6 °C) (Oral)   Ht 175.3 cm (69\")   Wt 91.9 kg (202 lb 9.6 oz)   SpO2 100%   BMI 29.92 kg/m²     Wt Readings from Last 3 Encounters:   08/20/24 91.9 kg (202 lb 9.6 oz)   08/13/24 90.7 kg (200 lb)   08/05/24 93.8 kg (206 lb 12.8 oz)     BP Readings from Last 3 Encounters:   08/20/24 122/70   08/13/24 134/77   08/05/24 130/76     Physical Exam  Vitals reviewed.   Constitutional:       Appearance: Normal appearance. She is well-developed.   HENT:      Head: Normocephalic and atraumatic.   Eyes:      Conjunctiva/sclera: Conjunctivae normal.      Pupils: Pupils are equal, round, and reactive to light.   Cardiovascular:      Rate and Rhythm: Normal rate and regular rhythm.      Heart sounds: No murmur heard.     No friction rub. No gallop.   Pulmonary:      Effort: Pulmonary effort is normal.      Breath sounds: Normal breath sounds. No wheezing or rhonchi.   Abdominal:      General: Bowel sounds are normal. There is no " distension.      Palpations: Abdomen is soft.      Tenderness: There is no abdominal tenderness. There is left CVA tenderness.       Skin:     General: Skin is warm and dry.   Neurological:      Mental Status: She is alert and oriented to person, place, and time.      Cranial Nerves: No cranial nerve deficit.   Psychiatric:         Mood and Affect: Mood and affect normal.         Behavior: Behavior normal.         Thought Content: Thought content normal.         Judgment: Judgment normal.       Physical Exam  Lungs are clear. No wheezing or fluid noted.  Heart sounds are normal. No tachycardia noted.      Result Review :    The following data was reviewed by BECCA Brewer on 08/20/24 at 11:46 EDT:        US Liver    Result Date: 6/10/2024  Impression: Findings compatible with numerous hepatic cysts. Electronically Signed: Nicole Davalos MD  6/10/2024 3:01 PM EDT  Workstation ID: [id number]      Results                 Assessment and Plan    Diagnoses and all orders for this visit:    1. Left flank pain (Primary)  -     Urinalysis With Culture If Indicated - Urine, Clean Catch    2. Viral gastroenteritis  -     ondansetron ODT (ZOFRAN-ODT) 4 MG disintegrating tablet; Place 1 tablet on the tongue Every 8 (Eight) Hours As Needed for Nausea or Vomiting.  Dispense: 16 tablet; Refill: 0    3. Chronic migraine with aura without status migrainosus, not intractable       Assessment & Plan  1. Abdominal pain.  The abdominal pain could be attributed to a hiatal hernia, which may have exacerbated the vomiting. Dehydration is also a possibility due to inadequate food intake and volume depletion with episodes of vomiting and diarrhea. A urinary tract infection (UTI) or kidney stone could be present, given the proximity of her tenderness to the left kidney flank area. However, a muscle strain from excessive vomiting cannot be ruled out. Pneumonia is unlikely as lung sounds and oxygen levels are normal.  She is not in  any respiratory distress.  A urinalysis will be ordered to rule out UTI or kidney stones. Zofran 4 mg will be prescribed for nausea. She is advised to increase her food and fluid intake to prevent dehydration and potential urinary complications.  Patient declines providing a urine sample at this time, stating that she is unable to urinate.  She was encouraged to return to the clinic as a walk-in either later today or tomorrow, especially if she begins feeling worse.    2. Headaches.  She typically takes Nurtec every other day, which helps manage her headaches, but she had run out of the medication. A refill was received on Wednesday. She is advised to avoid taking Excedrin Migraine as per her allergist's recommendation.            Smoking Cessation:    Larissa Donaldson  reports that she has never smoked. She has never used smokeless tobacco.             Follow Up   Return if symptoms worsen or fail to improve.  Patient was given instructions and counseling regarding her condition or for health maintenance advice. Please see specific information pulled into the AVS if appropriate.     Please note that portions of this note were completed with a voice recognition program.    Patient or patient representative verbalized consent for the use of Ambient Listening during the visit with  BECCA Brewer for chart documentation. 8/20/2024  11:46 EDT

## 2024-08-21 NOTE — PROGRESS NOTES
"Well Woman Visit    CC: Scheduled annual well gyn visit  Chief Complaint   Patient presents with    Annual Exam       HPI:   58 y.o.   Social History     Substance and Sexual Activity   Sexual Activity Yes    Partners: Male    Birth control/protection: Post-menopausal     Office Visit with Rin Wilks DO (2023)  Librado Chen HPV (2022 15:07) Pap negative, HPV negative  Mammo Screening Digital Tomosynthesis Bilateral With CAD (2023 07:20)    History of fibroid uterus by CT scan, they have not been palpable.  Abnormal Pap smears, most recent ones within normal limits.    .  No VB.  No HRT.  No hot flashes.     Vag dryness    PCP: does manage PMHx and preventative labs  History: PMHx, Meds, Allergies, PSHx, Social Hx, and POBHx all reviewed and updated.    PHYSICAL EXAM:  /78   Pulse 61   Ht 175.3 cm (69\")   Wt 92.1 kg (203 lb)   Breastfeeding No   BMI 29.98 kg/m²  Not found.   General- NAD, alert and oriented, appropriate  Psych- Normal mood, good memory  Neck- No masses, no thyroid enlargement  CV- Regular rhythm, no murmurs  Resp- CTA to bases, no wheezes  Abdomen- Soft, non distended, non tender, no masses    Chaperone present during breast and/or pelvic exam if performed.  Breast left-  Bilaterally symmetrical, no masses, non tender, no nipple discharge, no axillary or supraclavicular nodes palpable.    Breast right- Bilaterally symmetrical, no masses, non tender, no nipple discharge, no axillary or supraclavicular nodes palpable.      External genitalia- Normal female, no lesions  Urethra/meatus- Normal, no masses, non tender  Bladder- Normal, no masses, non tender  Vagina- Normal, atrophy, no lesions, no discharge.    Prolapse : none noted, not examined with split speculum to delineate  Cvx- Normal, no lesions, no discharge, No cervical motion tenderness  Uterus- Normal size, shape & consistency.  Non tender, mobile.  Adnexa- No mass, non tender  Anus/Rectum/Perineum- Normal " appearance, no mass, good sphincter tone, WITH small hemorrhoids, no prolapse    Lymphatic- No palpable neck, axillary, or groin nodes  Ext- No edema, no cyanosis    Skin- No lesions, no rashes, no acanthosis nigricans      ASSESSMENT and PLAN:    Diagnoses and all orders for this visit:    1. Well woman exam (Primary)  -     Mammo Screening Digital Tomosynthesis Bilateral With CAD; Future    2. Other abnormal cytological finding of specimen from cervix  Overview:  2007 ASCUS +HPV, neg colpo bx  2008 ASCUS +HPV  2010 TRINITY I  2013 and 2011 neg paps  2017 ASCUS HPV neg  2019 ASCUS HPV pos, colpo bx and ECC neg  2020 Neg  2022 Pap neg, HPV neg- routine screening q5yrs Pap and cotest        3. Vaginal atrophy  Overview:  8/26/2024 Rx vagifem and handout    Orders:  -     estradiol (VAGIFEM) 10 MCG tablet vaginal tablet; Insert 1 tablet into the vagina every night at bedtime. USE NIGHTLY FOR 14DAYS, THEN TRANSITION TO TWICE A WEEK (EVERY 3-4DAYS)  Dispense: 14 tablet; Refill: 0  -     estradiol (VAGIFEM) 10 MCG tablet vaginal tablet; Insert 1 tablet into the vagina 2 (Two) Times a Week.  Dispense: 24 tablet; Refill: 4        Preventative:  BREAST HEALTH- Monthly self breast exam importance and how to reviewed. MMG and/or MRI (prn) reviewed per society guidelines and her individual history. Screen: Updated today  CERVICAL CANCER Screening- Reviewed current ASCCP guidelines for screening w and wo cotest HPV, age specific.  Screen: Already up to date  COLON CANCER Screening- Reviewed current medical society guidelines and options.  Screen:  Already up to date  Importance of WEIGHT MANAGEMENT, nutrition, and exercise reviewed.  Ideal BMI discussed  BONE HEALTH- Reviewed current medical society guidelines and options for testing, calcium and vit D intake.  Weight bearing exercise.  DEXA: Not medically needed  VACCINATIONS Recommended: Covid vaccine, Flu annually, Tdap [id number], Zoster (51yo and older).  Importance discussed,  risk being unvaccinated reviewed.  Questions answered  Smoking status- NON SMOKER/VAPER  Follow up PCP/Specialist PMHx and/or Labs          She understands the importance of having any ordered tests to be performed in a timely fashion.  The risks of not performing them include, but are not limited to, advanced cancer stages, bone loss from osteoporosis and/or subsequent increase in morbidity and/or mortality.  She is encouraged to review her results online and/or contact or office if she has questions.     Follow Up:  Return in about 1 year (around 8/26/2025) for WWE.            Rin Wilks,   08/26/2024    [facility]  [facility] [place] [address]  Dept: [phone]  Dept Fax: [phone]  Loc: [phone]  Loc Fax: [phone]

## 2024-08-26 ENCOUNTER — OFFICE VISIT (OUTPATIENT)
Dept: OBSTETRICS AND GYNECOLOGY | Facility: CLINIC | Age: 59
End: 2024-08-26
Payer: COMMERCIAL

## 2024-08-26 VITALS
HEIGHT: 69 IN | SYSTOLIC BLOOD PRESSURE: 131 MMHG | HEART RATE: 61 BPM | DIASTOLIC BLOOD PRESSURE: 78 MMHG | BODY MASS INDEX: 30.07 KG/M2 | WEIGHT: 203 LBS

## 2024-08-26 DIAGNOSIS — N95.2 VAGINAL ATROPHY: ICD-10-CM

## 2024-08-26 DIAGNOSIS — Z01.419 WELL WOMAN EXAM: Primary | ICD-10-CM

## 2024-08-26 DIAGNOSIS — R87.618 OTHER ABNORMAL CYTOLOGICAL FINDING OF SPECIMEN FROM CERVIX: ICD-10-CM

## 2024-08-26 PROCEDURE — 99396 PREV VISIT EST AGE 40-64: CPT | Performed by: OBSTETRICS & GYNECOLOGY

## 2024-08-26 PROCEDURE — 99213 OFFICE O/P EST LOW 20 MIN: CPT | Performed by: OBSTETRICS & GYNECOLOGY

## 2024-08-26 RX ORDER — ESTRADIOL 10 UG/1
1 INSERT VAGINAL 2 TIMES WEEKLY
Qty: 24 TABLET | Refills: 4 | Status: SHIPPED | OUTPATIENT
Start: 2024-08-26

## 2024-08-26 RX ORDER — ESTRADIOL 10 UG/1
1 INSERT VAGINAL
Qty: 14 TABLET | Refills: 0 | Status: SHIPPED | OUTPATIENT
Start: 2024-08-26

## 2024-11-06 RX ORDER — OMEPRAZOLE 40 MG/1
40 CAPSULE, DELAYED RELEASE ORAL DAILY
Qty: 90 CAPSULE | Refills: 1 | Status: SHIPPED | OUTPATIENT
Start: 2024-11-06

## 2024-12-02 ENCOUNTER — HOSPITAL ENCOUNTER (OUTPATIENT)
Dept: ULTRASOUND IMAGING | Facility: HOSPITAL | Age: 59
Discharge: HOME | End: 2024-12-02
Payer: COMMERCIAL

## 2024-12-02 ENCOUNTER — HOSPITAL ENCOUNTER (OUTPATIENT)
Dept: MAMMOGRAPHY | Facility: HOSPITAL | Age: 59
Discharge: HOME | End: 2024-12-02
Payer: COMMERCIAL

## 2024-12-02 DIAGNOSIS — K76.89 LIVER CYST: ICD-10-CM

## 2024-12-02 DIAGNOSIS — Z01.419 WELL WOMAN EXAM: ICD-10-CM

## 2024-12-02 PROCEDURE — 76705 ECHO EXAM OF ABDOMEN: CPT

## 2024-12-02 PROCEDURE — 77063 BREAST TOMOSYNTHESIS BI: CPT

## 2024-12-02 PROCEDURE — 77067 SCR MAMMO BI INCL CAD: CPT

## 2025-01-03 ENCOUNTER — TELEPHONE (OUTPATIENT)
Dept: GASTROENTEROLOGY | Facility: CLINIC | Age: 60
End: 2025-01-03
Payer: COMMERCIAL

## 2025-01-03 NOTE — TELEPHONE ENCOUNTER
I left pt a VM reminding her to have labs performed at any [facility] facility. I left the office number as a call back number.

## 2025-02-22 DIAGNOSIS — I10 PRIMARY HYPERTENSION: ICD-10-CM

## 2025-02-24 RX ORDER — LOSARTAN POTASSIUM AND HYDROCHLOROTHIAZIDE 12.5; 1 MG/1; MG/1
1 TABLET ORAL DAILY
Qty: 90 TABLET | Refills: 0 | Status: SHIPPED | OUTPATIENT
Start: 2025-02-24

## 2025-05-05 RX ORDER — OMEPRAZOLE 40 MG/1
40 CAPSULE, DELAYED RELEASE ORAL DAILY
Qty: 90 CAPSULE | Refills: 0 | Status: SHIPPED | OUTPATIENT
Start: 2025-05-05

## 2025-05-05 NOTE — TELEPHONE ENCOUNTER
Medication Requested Omeprazole 40 MG    Last Refill - never filled by gastro    Last OV 08/05/24    Next OV Not scheduled    Medication pended for approval and correct pharmacy verified Yes

## 2025-05-12 ENCOUNTER — PATIENT ROUNDING (BHMG ONLY) (OUTPATIENT)
Dept: URGENT CARE | Facility: CLINIC | Age: 60
End: 2025-05-12
Payer: COMMERCIAL

## 2025-05-12 NOTE — ED NOTES
Thank you for letting us care for you in your recent visit to our urgent care center. We would love to hear about your experience with us. Was this the first time you have visited our location?    We're always looking for ways to make our patients' experiences even better. Do you have any recommendations on ways we may improve?     I appreciate you taking the time to respond. Please be on the lookout for a survey about your recent visit from [employer] via text or email. We would greatly appreciate if you could fill that out and turn it back in. We want your voice to be heard and we value your feedback.   Thank you for choosing [facility] for your healthcare needs.    ambulatory

## 2025-05-14 DIAGNOSIS — E78.2 MIXED HYPERLIPIDEMIA: ICD-10-CM

## 2025-05-14 RX ORDER — EZETIMIBE AND SIMVASTATIN 10; 40 MG/1; MG/1
1 TABLET ORAL
Qty: 90 TABLET | Refills: 3 | OUTPATIENT
Start: 2025-05-14

## 2025-05-16 RX ORDER — ERGOCALCIFEROL 1.25 MG/1
50000 CAPSULE, LIQUID FILLED ORAL WEEKLY
Qty: 12 CAPSULE | Refills: 4 | OUTPATIENT
Start: 2025-05-16

## 2025-05-22 DIAGNOSIS — I10 PRIMARY HYPERTENSION: ICD-10-CM

## 2025-05-22 RX ORDER — LOSARTAN POTASSIUM AND HYDROCHLOROTHIAZIDE 12.5; 1 MG/1; MG/1
1 TABLET ORAL DAILY
Qty: 90 TABLET | Refills: 0 | OUTPATIENT
Start: 2025-05-22

## 2025-08-04 RX ORDER — OMEPRAZOLE 40 MG/1
40 CAPSULE, DELAYED RELEASE ORAL DAILY
Qty: 90 CAPSULE | Refills: 0 | OUTPATIENT
Start: 2025-08-04

## 2025-08-14 RX ORDER — UBIDECARENONE 75 MG
50 CAPSULE ORAL DAILY
Qty: 45 TABLET | Refills: 3 | OUTPATIENT
Start: 2025-08-14

## 2025-08-21 ENCOUNTER — OFFICE VISIT (OUTPATIENT)
Dept: FAMILY MEDICINE CLINIC | Facility: CLINIC | Age: 60
End: 2025-08-21
Payer: COMMERCIAL

## 2025-08-21 VITALS
DIASTOLIC BLOOD PRESSURE: 68 MMHG | BODY MASS INDEX: 29.62 KG/M2 | OXYGEN SATURATION: 99 % | HEIGHT: 69 IN | SYSTOLIC BLOOD PRESSURE: 114 MMHG | HEART RATE: 59 BPM | WEIGHT: 200 LBS | TEMPERATURE: 97.6 F

## 2025-08-21 DIAGNOSIS — I10 PRIMARY HYPERTENSION: ICD-10-CM

## 2025-08-21 DIAGNOSIS — E55.9 VITAMIN D DEFICIENCY: ICD-10-CM

## 2025-08-21 DIAGNOSIS — E53.8 B12 DEFICIENCY: ICD-10-CM

## 2025-08-21 DIAGNOSIS — E78.2 MIXED HYPERLIPIDEMIA: ICD-10-CM

## 2025-08-21 DIAGNOSIS — R73.03 PREDIABETES: ICD-10-CM

## 2025-08-21 DIAGNOSIS — A08.4 VIRAL GASTROENTERITIS: ICD-10-CM

## 2025-08-21 DIAGNOSIS — K42.9 UMBILICAL HERNIA WITHOUT OBSTRUCTION AND WITHOUT GANGRENE: ICD-10-CM

## 2025-08-21 DIAGNOSIS — Z00.00 ANNUAL PHYSICAL EXAM: Primary | ICD-10-CM

## 2025-08-21 DIAGNOSIS — R00.1 BRADYCARDIA: ICD-10-CM

## 2025-08-21 LAB
25(OH)D3 SERPL-MCNC: 40.4 NG/ML (ref 30–100)
ALBUMIN SERPL-MCNC: 4.1 G/DL (ref 3.5–5.2)
ALBUMIN/GLOB SERPL: 1.4 G/DL
ALP SERPL-CCNC: 79 U/L (ref 39–117)
ALT SERPL W P-5'-P-CCNC: 25 U/L (ref 1–33)
ANION GAP SERPL CALCULATED.3IONS-SCNC: 12.1 MMOL/L (ref 5–15)
AST SERPL-CCNC: 31 U/L (ref 1–32)
BASOPHILS # BLD AUTO: 0.07 10*3/MM3 (ref 0–0.2)
BASOPHILS NFR BLD AUTO: 1 % (ref 0–1.5)
BILIRUB SERPL-MCNC: 0.3 MG/DL (ref 0–1.2)
BUN SERPL-MCNC: 21 MG/DL (ref 6–20)
BUN/CREAT SERPL: 22.6 (ref 7–25)
CALCIUM SPEC-SCNC: 9.9 MG/DL (ref 8.6–10.5)
CHLORIDE SERPL-SCNC: 103 MMOL/L (ref 98–107)
CHOLEST SERPL-MCNC: 191 MG/DL (ref 0–200)
CO2 SERPL-SCNC: 25.9 MMOL/L (ref 22–29)
CREAT SERPL-MCNC: 0.93 MG/DL (ref 0.57–1)
DEPRECATED RDW RBC AUTO: 42 FL (ref 37–54)
EGFRCR SERPLBLD CKD-EPI 2021: 70.9 ML/MIN/1.73
EOSINOPHIL # BLD AUTO: 0.38 10*3/MM3 (ref 0–0.4)
EOSINOPHIL NFR BLD AUTO: 5.5 % (ref 0.3–6.2)
ERYTHROCYTE [DISTWIDTH] IN BLOOD BY AUTOMATED COUNT: 11.9 % (ref 12.3–15.4)
FOLATE SERPL-MCNC: >20 NG/ML (ref 4.78–24.2)
GLOBULIN UR ELPH-MCNC: 2.9 GM/DL
GLUCOSE SERPL-MCNC: 84 MG/DL (ref 65–99)
HBA1C MFR BLD: 5.5 % (ref 4.8–5.6)
HCT VFR BLD AUTO: 39 % (ref 34–46.6)
HDLC SERPL-MCNC: 85 MG/DL (ref 40–60)
HGB BLD-MCNC: 12.5 G/DL (ref 12–15.9)
IMM GRANULOCYTES # BLD AUTO: 0.02 10*3/MM3 (ref 0–0.05)
IMM GRANULOCYTES NFR BLD AUTO: 0.3 % (ref 0–0.5)
LDLC SERPL CALC-MCNC: 97 MG/DL (ref 0–100)
LDLC/HDLC SERPL: 1.14 {RATIO}
LYMPHOCYTES # BLD AUTO: 2.62 10*3/MM3 (ref 0.7–3.1)
LYMPHOCYTES NFR BLD AUTO: 37.8 % (ref 19.6–45.3)
MCH RBC QN AUTO: 30.6 PG (ref 26.6–33)
MCHC RBC AUTO-ENTMCNC: 32.1 G/DL (ref 31.5–35.7)
MCV RBC AUTO: 95.4 FL (ref 79–97)
MONOCYTES # BLD AUTO: 0.53 10*3/MM3 (ref 0.1–0.9)
MONOCYTES NFR BLD AUTO: 7.6 % (ref 5–12)
NEUTROPHILS NFR BLD AUTO: 3.31 10*3/MM3 (ref 1.7–7)
NEUTROPHILS NFR BLD AUTO: 47.8 % (ref 42.7–76)
NRBC BLD AUTO-RTO: 0 /100 WBC (ref 0–0.2)
PLATELET # BLD AUTO: 221 10*3/MM3 (ref 140–450)
PMV BLD AUTO: 9.7 FL (ref 6–12)
POTASSIUM SERPL-SCNC: 4.1 MMOL/L (ref 3.5–5.2)
PROT SERPL-MCNC: 7 G/DL (ref 6–8.5)
RBC # BLD AUTO: 4.09 10*6/MM3 (ref 3.77–5.28)
SODIUM SERPL-SCNC: 141 MMOL/L (ref 136–145)
T4 FREE SERPL-MCNC: 1.2 NG/DL (ref 0.92–1.68)
TRIGL SERPL-MCNC: 46 MG/DL (ref 0–150)
TSH SERPL DL<=0.05 MIU/L-ACNC: 1.59 UIU/ML (ref 0.27–4.2)
VIT B12 BLD-MCNC: 971 PG/ML (ref 211–946)
VLDLC SERPL-MCNC: 9 MG/DL (ref 5–40)
WBC NRBC COR # BLD AUTO: 6.93 10*3/MM3 (ref 3.4–10.8)

## 2025-08-21 PROCEDURE — 82746 ASSAY OF FOLIC ACID SERUM: CPT

## 2025-08-21 PROCEDURE — 82306 VITAMIN D 25 HYDROXY: CPT

## 2025-08-21 PROCEDURE — 80061 LIPID PANEL: CPT

## 2025-08-21 PROCEDURE — 82607 VITAMIN B-12: CPT

## 2025-08-21 PROCEDURE — 80050 GENERAL HEALTH PANEL: CPT

## 2025-08-21 PROCEDURE — 83036 HEMOGLOBIN GLYCOSYLATED A1C: CPT

## 2025-08-21 PROCEDURE — 84439 ASSAY OF FREE THYROXINE: CPT

## 2025-08-21 RX ORDER — ERGOCALCIFEROL 1.25 MG/1
50000 CAPSULE, LIQUID FILLED ORAL WEEKLY
Qty: 13 CAPSULE | Refills: 1 | Status: SHIPPED | OUTPATIENT
Start: 2025-08-21

## 2025-08-21 RX ORDER — ONDANSETRON 4 MG/1
4 TABLET, ORALLY DISINTEGRATING ORAL EVERY 8 HOURS PRN
Qty: 20 TABLET | Refills: 1 | Status: SHIPPED | OUTPATIENT
Start: 2025-08-21

## 2025-08-21 RX ORDER — LOSARTAN POTASSIUM AND HYDROCHLOROTHIAZIDE 12.5; 1 MG/1; MG/1
1 TABLET ORAL DAILY
Qty: 90 TABLET | Refills: 3 | Status: SHIPPED | OUTPATIENT
Start: 2025-08-21

## 2025-08-21 RX ORDER — EZETIMIBE AND SIMVASTATIN 10; 40 MG/1; MG/1
1 TABLET ORAL
Qty: 90 TABLET | Refills: 3 | Status: SHIPPED | OUTPATIENT
Start: 2025-08-21

## 2025-08-21 RX ORDER — UBIDECARENONE 75 MG
50 CAPSULE ORAL DAILY
Qty: 45 TABLET | Refills: 3 | Status: SHIPPED | OUTPATIENT
Start: 2025-08-21

## 2025-08-25 ENCOUNTER — RESULTS FOLLOW-UP (OUTPATIENT)
Dept: FAMILY MEDICINE CLINIC | Facility: CLINIC | Age: 60
End: 2025-08-25
Payer: COMMERCIAL

## (undated) DEVICE — BLCK/BITE BLOX WO/DENTL/RIM W/STRAP 54F

## (undated) DEVICE — Device: Brand: DEFENDO AIR/WATER/SUCTION AND BIOPSY VALVE

## (undated) DEVICE — SINGLE-USE BIOPSY FORCEPS: Brand: RADIAL JAW 4

## (undated) DEVICE — SOLIDIFIER LIQLOC PLS 1500CC BT

## (undated) DEVICE — SOL IRRG H2O PL/BG 1000ML STRL

## (undated) DEVICE — Device

## (undated) DEVICE — LINER SURG CANSTR SXN S/RIGD 1500CC

## (undated) DEVICE — CONN JET HYDRA H20 AUXILIARY DISP